# Patient Record
Sex: FEMALE | Race: WHITE | Employment: OTHER | URBAN - METROPOLITAN AREA
[De-identification: names, ages, dates, MRNs, and addresses within clinical notes are randomized per-mention and may not be internally consistent; named-entity substitution may affect disease eponyms.]

---

## 2017-01-03 ENCOUNTER — HOSPITAL ENCOUNTER (OUTPATIENT)
Age: 72
Discharge: HOME | End: 2017-01-03
Attending: FAMILY MEDICINE

## 2017-01-03 ENCOUNTER — APPOINTMENT (OUTPATIENT)
Dept: GENERAL RADIOLOGY | Age: 72
End: 2017-01-03
Attending: FAMILY MEDICINE

## 2017-01-03 VITALS
RESPIRATION RATE: 18 BRPM | TEMPERATURE: 98 F | BODY MASS INDEX: 27.97 KG/M2 | HEART RATE: 94 BPM | DIASTOLIC BLOOD PRESSURE: 90 MMHG | WEIGHT: 152 LBS | OXYGEN SATURATION: 95 % | HEIGHT: 62 IN | SYSTOLIC BLOOD PRESSURE: 155 MMHG

## 2017-01-03 DIAGNOSIS — J20.9 ACUTE BRONCHITIS, UNSPECIFIED ORGANISM: Primary | ICD-10-CM

## 2017-01-03 PROCEDURE — 71020 XR CHEST PA + LAT CHEST (CPT=71020): CPT

## 2017-01-03 PROCEDURE — 99204 OFFICE O/P NEW MOD 45 MIN: CPT

## 2017-01-03 PROCEDURE — 99213 OFFICE O/P EST LOW 20 MIN: CPT

## 2017-01-03 RX ORDER — AZITHROMYCIN 250 MG/1
TABLET, FILM COATED ORAL
Qty: 1 PACKAGE | Refills: 0 | Status: SHIPPED | OUTPATIENT
Start: 2017-01-03 | End: 2017-01-08

## 2017-01-03 NOTE — ED PROVIDER NOTES
Patient Seen in: [facility]    History   Patient presents with:  Cough/URI    Stated Complaint: cough for 9 days     HPI    Yari Necessary is a 73year old female presented with chief complaint of cough for 9 days.   Magdy routine general medical examination 6/29/2012   • Diverticulosis 6/1/2009     and hyperplastic polyp            Past Surgical History    COLONOSCOPY  2001, 2006, 6/2009    Comment diverticulosis    TUBAL LIGATION  6/1/1989    Comment Laparoscopy with B/L t above.    PSFH elements reviewed from today and agreed except as otherwise stated in HPI.     Physical Exam     ED Triage Vitals   BP 01/03/17 1037 155/90 mmHg   Pulse 01/03/17 1037 94   Resp 01/03/17 1037 18   Temp 01/03/17 1037 98.2 °F (36.8 °C)   Temp sr crepitations. Chest x-ray showed no acute consolidation or pneumonia. Will treat with Z-Master for now. Follow-up with PCP if not better.   If has any worsening symptoms go to the      Disposition and Plan     Clinical Impression:  Acute bronchitis, unspeci

## 2017-01-09 ENCOUNTER — OFFICE VISIT (OUTPATIENT)
Dept: INTERNAL MEDICINE CLINIC | Facility: CLINIC | Age: 72
End: 2017-01-09

## 2017-01-09 VITALS
BODY MASS INDEX: 28.35 KG/M2 | HEIGHT: 62.5 IN | WEIGHT: 158 LBS | HEART RATE: 106 BPM | SYSTOLIC BLOOD PRESSURE: 130 MMHG | TEMPERATURE: 98 F | RESPIRATION RATE: 12 BRPM | OXYGEN SATURATION: 96 % | DIASTOLIC BLOOD PRESSURE: 80 MMHG

## 2017-01-09 DIAGNOSIS — R05.9 COUGH: Primary | ICD-10-CM

## 2017-01-09 DIAGNOSIS — R09.89 ABNORMAL LUNG SOUNDS: ICD-10-CM

## 2017-01-09 DIAGNOSIS — R93.89 ABNORMAL CHEST X-RAY: ICD-10-CM

## 2017-01-09 PROCEDURE — 99214 OFFICE O/P EST MOD 30 MIN: CPT | Performed by: INTERNAL MEDICINE

## 2017-01-09 RX ORDER — FLUTICASONE PROPIONATE AND SALMETEROL 100; 50 UG/1; UG/1
1 POWDER RESPIRATORY (INHALATION) 2 TIMES DAILY
Qty: 60 EACH | Refills: 0 | COMMUNITY
Start: 2017-01-09 | End: 2017-01-16 | Stop reason: ALTCHOICE

## 2017-01-09 NOTE — PROGRESS NOTES
Ana Luisa Child is a 73year old female.   HPI:   CC: follow up from immediate care  Pt had cxr normal  Finished zpak this past Sat  Pt  Has nonproductive cough since Christmas Day  Coughing attacks improved with zpak  Still hears or feels a noise/rattle  N augmentation    • Osteoarthrosis, localized, primary, knee 6/14/2011   • Medial meniscus tear 10/10/2011   • Laboratory examination ordered as part of a routine general medical examination 6/29/2012   • Diverticulosis 6/1/2009     and hyperplastic polyp injected, TM intact no erythema dull bilateral , oropharynx clear, posterior pharynx without erythema, clear postnasal drip, nasal turbinates swollen left greater than right  NECK: supple,no masses,  LYMPH: no cervical adenopathy,   CARDIO: [id number] no S3S4

## 2017-01-16 ENCOUNTER — OFFICE VISIT (OUTPATIENT)
Dept: INTERNAL MEDICINE CLINIC | Facility: CLINIC | Age: 72
End: 2017-01-16

## 2017-01-16 VITALS
WEIGHT: 158 LBS | RESPIRATION RATE: 20 BRPM | SYSTOLIC BLOOD PRESSURE: 120 MMHG | TEMPERATURE: 98 F | BODY MASS INDEX: 28.35 KG/M2 | HEART RATE: 84 BPM | HEIGHT: 62.5 IN | OXYGEN SATURATION: 98 % | DIASTOLIC BLOOD PRESSURE: 80 MMHG

## 2017-01-16 DIAGNOSIS — J98.9 REACTIVE AIRWAY DISEASE THAT IS NOT ASTHMA: Primary | ICD-10-CM

## 2017-01-16 PROCEDURE — 99213 OFFICE O/P EST LOW 20 MIN: CPT | Performed by: INTERNAL MEDICINE

## 2017-01-16 NOTE — PROGRESS NOTES
Minnie Costa is a 73year old female. HPI:   CC:cough better   Finished advair already  Pt does not hear abnormal sounds anymore  Pt sleeping well        ALL:  Vicodin [Hydrocodon*        Comment:CONSTIPATION.     Current Outpatient Prescriptions:  Asco medical examination 6/29/2012   • Diverticulosis 6/1/2009     and hyperplastic polyp          Past Surgical History    COLONOSCOPY  2001, 2006, 6/2009    Comment diverticulosis    TUBAL LIGATION  6/1/1989    Comment Laparoscopy with B/L tubal fulguration a murmur,  LUNGS: clear to auscultation,       ASSESSMENT AND PLAN:   Reactive airway disease that is not asthma  (primary encounter diagnosis)- resolved        Meds & Refills for this Visit:  No prescriptions requested or ordered in this encounter    Renin

## 2017-01-27 ENCOUNTER — TELEPHONE (OUTPATIENT)
Dept: INTERNAL MEDICINE CLINIC | Facility: CLINIC | Age: 72
End: 2017-01-27

## 2017-01-27 DIAGNOSIS — L57.0 ACTINIC KERATOSIS: Primary | ICD-10-CM

## 2017-01-27 NOTE — TELEPHONE ENCOUNTER
Referral placed 8/18/16 to Dr. Falguni Hewitt was for 3 visits, but only 1 visit approved.   Ok to place new referral?

## 2017-01-27 NOTE — TELEPHONE ENCOUNTER
Incoming (mail or fax): Fax  Received from:  Fawn Finch- needs more information regarding referral  Documentation given to:  [address] fax bin.

## 2017-03-07 ENCOUNTER — TELEPHONE (OUTPATIENT)
Dept: INTERNAL MEDICINE CLINIC | Facility: CLINIC | Age: 72
End: 2017-03-07

## 2017-08-01 ENCOUNTER — OFFICE VISIT (OUTPATIENT)
Dept: INTERNAL MEDICINE CLINIC | Facility: CLINIC | Age: 72
End: 2017-08-01

## 2017-08-01 VITALS
SYSTOLIC BLOOD PRESSURE: 120 MMHG | OXYGEN SATURATION: 98 % | BODY MASS INDEX: 27.81 KG/M2 | HEIGHT: 62.5 IN | RESPIRATION RATE: 16 BRPM | DIASTOLIC BLOOD PRESSURE: 70 MMHG | HEART RATE: 90 BPM | WEIGHT: 155 LBS

## 2017-08-01 DIAGNOSIS — Z00.00 ENCOUNTER FOR ANNUAL HEALTH EXAMINATION: ICD-10-CM

## 2017-08-01 DIAGNOSIS — Z78.0 POSTMENOPAUSAL: ICD-10-CM

## 2017-08-01 DIAGNOSIS — Z86.010 HX OF COLONIC POLYPS: ICD-10-CM

## 2017-08-01 DIAGNOSIS — Z80.0 FAMILY HX OF COLON CANCER: ICD-10-CM

## 2017-08-01 DIAGNOSIS — Z12.31 VISIT FOR SCREENING MAMMOGRAM: ICD-10-CM

## 2017-08-01 DIAGNOSIS — E78.00 HIGH CHOLESTEROL: Primary | ICD-10-CM

## 2017-08-01 PROBLEM — Z96.651 STATUS POST TOTAL RIGHT KNEE REPLACEMENT: Status: ACTIVE | Noted: 2017-08-01

## 2017-08-01 PROBLEM — M47.814 THORACIC ARTHRITIS: Status: ACTIVE | Noted: 2017-08-01

## 2017-08-01 PROCEDURE — 96160 PT-FOCUSED HLTH RISK ASSMT: CPT | Performed by: INTERNAL MEDICINE

## 2017-08-01 NOTE — PROGRESS NOTES
HPI:   Jg Green is a 73year old female who presents for a MA (Medicare Advantage) [facility] (Once per calendar year).       Annual Physical due on 08/18/2017        Patient Care Team: Patient Care Team:  Hai Santos, DO as PCP - General  Hai Santos, flashes; Irregular periods (1995); Knee pain (2011); Laboratory examination ordered as part of a routine general medical examination (6/29/2012); Laceration of finger, right (3/24/2001); Measles; Medial meniscus tear (10/10/2011); Mole (skin) (1999);  Mumps lb   SpO2 98%   BMI 27.90 kg/m²  Estimated body mass index is 27.9 kg/m² as calculated from the following:    Height as of this encounter: 62.5\". Weight as of this encounter: 155 lb.            GENERAL: well developed, well nourished, in no apparent dis 10/28/2015   • Pneumovax 23 11/30/2016   • TD 01/01/2005   • TDAP 06/08/2011   • Zoster (Shingles) 08/17/2014       ASSESSMENT AND OTHER RELEVANT CHRONIC CONDITIONS:   Stan Catherine is a 73year old female who presents for a Medicare Assessment.    Kathleen Moralez been poor?: No    How does the patient maintain a good energy level?: Appropriate Exercise    How would you describe your daily physical activity?: Moderate    How would you describe your current health state?: Good    How do you maintain positive mental w Yes    Do you have a living will?: Yes       Cognitive Assessment     What day of the week is this?: Correct    What month is it?: Correct    What year is it?: Correct    Recall \"Ball\": Correct    Recall \"Flag\": Correct    Recall \"Tree\": Correct 11/22/2017 Update Health Maintenance if applicable     Immunizations (Update Immunization Activity if applicable)     Influenza  Covered Annually 9/28/2016 Please get every year    Pneumococcal 13 (Prevnar)  Covered Once after 65 10/28/2015 Please get once (H)    No flowsheet data found. Dilated Eye exam  Annually No flowsheet data found. No flowsheet data found. COPD      Spirometry Testing Annually Spirometry date:  No flowsheet data found.          Template: JOHN SALONI MEDICARE ANNUAL ASSESSMENT FEMAL

## 2017-08-01 NOTE — PATIENT INSTRUCTIONS
Kaylee Byrd's SCREENING SCHEDULE   Tests on this list are recommended by your physician but may not be covered, or covered at this frequency, by your insurer. Please check with your insurance carrier before scheduling to verify coverage.    PREVENTATI Covered up to Age 76     Colonoscopy Screen   Covered every 10 years- more often if abnormal Colonoscopy,3 Years due on 04/24/2015 Update Health Maintenance if applicable    Flex Sigmoidoscopy Screen  Covered every 5 years No results found for this or any 10/28/15  -PNEUMOCOCCAL VACC, 13 CAITLYN IM    Please get once after your 65th birthday    Pneumococcal 23 (Pneumovax)  Covered Once after 65 No orders found for this or any previous visit.  Please get once after your 65th birthday    Hepatitis B for Moderate/H

## 2017-08-15 ENCOUNTER — LAB ENCOUNTER (OUTPATIENT)
Dept: LAB | Age: 72
End: 2017-08-15
Attending: INTERNAL MEDICINE
Payer: MEDICARE

## 2017-08-15 DIAGNOSIS — E78.00 HIGH CHOLESTEROL: ICD-10-CM

## 2017-08-15 DIAGNOSIS — Z00.00 ENCOUNTER FOR ANNUAL HEALTH EXAMINATION: ICD-10-CM

## 2017-08-15 LAB
ALBUMIN SERPL-MCNC: 4 G/DL (ref 3.5–4.8)
ALP LIVER SERPL-CCNC: 76 U/L (ref 55–142)
ALT SERPL-CCNC: 31 U/L (ref 14–54)
AST SERPL-CCNC: 27 U/L (ref 15–41)
BILIRUB SERPL-MCNC: 0.4 MG/DL (ref 0.1–2)
BUN BLD-MCNC: 14 MG/DL (ref 8–20)
CALCIUM BLD-MCNC: 9.5 MG/DL (ref 8.3–10.3)
CHLORIDE: 107 MMOL/L (ref 101–111)
CHOLEST SMN-MCNC: 225 MG/DL (ref ?–200)
CO2: 26 MMOL/L (ref 22–32)
CREAT BLD-MCNC: 0.74 MG/DL (ref 0.55–1.02)
GLUCOSE BLD-MCNC: 81 MG/DL (ref 70–99)
HDLC SERPL-MCNC: 81 MG/DL (ref 45–?)
HDLC SERPL: 2.78 {RATIO} (ref ?–4.44)
HEPATITIS C VIRUS AB INTERPRETATION: NONREACTIVE
LDLC SERPL CALC-MCNC: 119 MG/DL (ref ?–130)
LDLC SERPL-MCNC: 25 MG/DL (ref 5–40)
M PROTEIN MFR SERPL ELPH: 7.1 G/DL (ref 6.1–8.3)
NONHDLC SERPL-MCNC: 144 MG/DL (ref ?–130)
POTASSIUM SERPL-SCNC: 4.1 MMOL/L (ref 3.6–5.1)
SODIUM SERPL-SCNC: 141 MMOL/L (ref 136–144)
TRIGLYCERIDES: 127 MG/DL (ref ?–150)

## 2017-08-15 PROCEDURE — 86803 HEPATITIS C AB TEST: CPT

## 2017-08-15 PROCEDURE — 80061 LIPID PANEL: CPT

## 2017-08-15 PROCEDURE — 80053 COMPREHEN METABOLIC PANEL: CPT

## 2017-08-15 PROCEDURE — 36415 COLL VENOUS BLD VENIPUNCTURE: CPT

## 2017-12-06 ENCOUNTER — TELEPHONE (OUTPATIENT)
Dept: INTERNAL MEDICINE CLINIC | Facility: CLINIC | Age: 72
End: 2017-12-06

## 2017-12-06 DIAGNOSIS — L57.0 ACTINIC KERATOSIS: Primary | ICD-10-CM

## 2017-12-06 NOTE — TELEPHONE ENCOUNTER
Functional Status Done?:  yes                               Functional Status Needs Review?:  no    Specialty Provider's Name?:  Dr Gina Padgett?:  derm  Reason for Visit?: check up  Diagnosis?:  Skin check  Number of Visits Requested?:  3

## 2017-12-07 NOTE — TELEPHONE ENCOUNTER
Noted below. Order for referral placed. Spoke with pt. Advised that referral placed. Pt voiced understanding.

## 2018-01-26 ENCOUNTER — HOSPITAL ENCOUNTER (OUTPATIENT)
Dept: MAMMOGRAPHY | Age: 73
Discharge: HOME | End: 2018-01-26
Attending: INTERNAL MEDICINE
Payer: MEDICARE

## 2018-01-26 ENCOUNTER — HOSPITAL ENCOUNTER (OUTPATIENT)
Dept: BONE DENSITY | Age: 73
Discharge: HOME | End: 2018-01-26
Attending: INTERNAL MEDICINE
Payer: MEDICARE

## 2018-01-26 DIAGNOSIS — Z12.31 VISIT FOR SCREENING MAMMOGRAM: ICD-10-CM

## 2018-01-26 DIAGNOSIS — Z78.0 POSTMENOPAUSAL: ICD-10-CM

## 2018-01-26 PROCEDURE — 77067 SCR MAMMO BI INCL CAD: CPT | Performed by: INTERNAL MEDICINE

## 2018-01-26 PROCEDURE — 77080 DXA BONE DENSITY AXIAL: CPT | Performed by: INTERNAL MEDICINE

## 2018-01-29 ENCOUNTER — TELEPHONE (OUTPATIENT)
Dept: INTERNAL MEDICINE CLINIC | Facility: CLINIC | Age: 73
End: 2018-01-29

## 2018-05-29 PROCEDURE — 88305 TISSUE EXAM BY PATHOLOGIST: CPT | Performed by: INTERNAL MEDICINE

## 2018-06-15 ENCOUNTER — TELEPHONE (OUTPATIENT)
Dept: INTERNAL MEDICINE CLINIC | Facility: CLINIC | Age: 73
End: 2018-06-15

## 2018-06-15 DIAGNOSIS — L57.0 ACTINIC KERATOSES: Primary | ICD-10-CM

## 2018-06-18 NOTE — TELEPHONE ENCOUNTER
LM for pt to call back, could not leave [facility]. Referral placed for Dr Mar Orozco.

## 2018-06-26 ENCOUNTER — TELEPHONE (OUTPATIENT)
Dept: INTERNAL MEDICINE CLINIC | Facility: CLINIC | Age: 73
End: 2018-06-26

## 2018-06-26 NOTE — TELEPHONE ENCOUNTER
LMOM for patient to change PCP to new doctor. PSR going over supervisit list, noticed patient scheduled a supervisit with new PCP, lmom for patient to call P to get name changed. PSR told patient to call back with any questions.

## 2018-07-10 ENCOUNTER — OFFICE VISIT (OUTPATIENT)
Dept: INTERNAL MEDICINE CLINIC | Facility: CLINIC | Age: 73
End: 2018-07-10

## 2018-07-10 VITALS
HEIGHT: 62 IN | SYSTOLIC BLOOD PRESSURE: 100 MMHG | WEIGHT: 147 LBS | TEMPERATURE: 99 F | RESPIRATION RATE: 16 BRPM | BODY MASS INDEX: 27.05 KG/M2 | DIASTOLIC BLOOD PRESSURE: 60 MMHG | HEART RATE: 72 BPM

## 2018-07-10 DIAGNOSIS — Z00.00 ROUTINE GENERAL MEDICAL EXAMINATION AT A HEALTH CARE FACILITY: Primary | ICD-10-CM

## 2018-07-10 DIAGNOSIS — K63.5 SESSILE COLONIC POLYP: ICD-10-CM

## 2018-07-10 DIAGNOSIS — Z12.83 SCREENING FOR SKIN CANCER: ICD-10-CM

## 2018-07-10 DIAGNOSIS — M81.0 AGE-RELATED OSTEOPOROSIS WITHOUT CURRENT PATHOLOGICAL FRACTURE: ICD-10-CM

## 2018-07-10 DIAGNOSIS — E78.2 MIXED HYPERLIPIDEMIA: ICD-10-CM

## 2018-07-10 DIAGNOSIS — Z12.39 SCREENING BREAST EXAMINATION: ICD-10-CM

## 2018-07-10 PROBLEM — M47.814 THORACIC ARTHRITIS: Status: RESOLVED | Noted: 2017-08-01 | Resolved: 2018-07-10

## 2018-07-10 PROCEDURE — G0438 PPPS, INITIAL VISIT: HCPCS | Performed by: INTERNAL MEDICINE

## 2018-07-10 PROCEDURE — 96160 PT-FOCUSED HLTH RISK ASSMT: CPT | Performed by: INTERNAL MEDICINE

## 2018-07-10 NOTE — PATIENT INSTRUCTIONS
You need 3 eight ounce servings of calcium/vitamin D daily. This includes spinach, kale, broccoli, almonds, milk, yogurt, or cottage cheese. Please have fasting labs completed as soon as possible.  I will order the alendronate for your bones once your v

## 2018-07-10 NOTE — PROGRESS NOTES
Ana Luisa Child is a 81year old female. HPI:   Patient presents for medicare supervisit and additional issues noted below. 1. Strong family history of colon cancer: she just had colonoscopy completed. She has no concerns.    2. Overweight: she has king density (simple cyst)   • Cervical lesion 1995   • Chest tightness    • Chest wall pain    • Chickenpox    • Cough    • Diverticulitis 2002, 5/09 4/02-sigmoid diverticulitis   • Diverticulosis 6/2009    and hyperplastic polyp   • Diverticulosis 6/1/2009 Laparoscopy with B/L tubal fulguration and                cutting   Social History:    Smoking status: Never Smoker                                                              Smokeless tobacco: Never Used                      Alcohol use:  Yes mammogram  Bone Health/Fall Prevention (Hernan Chi): see above  Vaccines: up to date with prevnar 13, pneumovac 23. TDAP 2011. Shingles 2014. Cont yearly flu shot. Advised on shingrix vaccine. Hep C screening (born [birth year]):  Hep C Ab negative in 2017  a

## 2018-07-11 ENCOUNTER — APPOINTMENT (OUTPATIENT)
Dept: LAB | Age: 73
End: 2018-07-11
Attending: INTERNAL MEDICINE
Payer: MEDICARE

## 2018-07-11 DIAGNOSIS — Z12.83 SCREENING FOR SKIN CANCER: ICD-10-CM

## 2018-07-11 DIAGNOSIS — M81.0 AGE-RELATED OSTEOPOROSIS WITHOUT CURRENT PATHOLOGICAL FRACTURE: ICD-10-CM

## 2018-07-11 DIAGNOSIS — Z12.39 SCREENING BREAST EXAMINATION: ICD-10-CM

## 2018-07-11 DIAGNOSIS — E78.2 MIXED HYPERLIPIDEMIA: ICD-10-CM

## 2018-07-11 DIAGNOSIS — K63.5 SESSILE COLONIC POLYP: ICD-10-CM

## 2018-07-11 DIAGNOSIS — Z00.00 ROUTINE GENERAL MEDICAL EXAMINATION AT A HEALTH CARE FACILITY: ICD-10-CM

## 2018-07-11 LAB
25-HYDROXYVITAMIN D (TOTAL): 28.5 NG/ML (ref 30–100)
BUN BLD-MCNC: 15 MG/DL (ref 8–20)
CALCIUM BLD-MCNC: 9.4 MG/DL (ref 8.3–10.3)
CHLORIDE: 108 MMOL/L (ref 101–111)
CHOLEST SMN-MCNC: 199 MG/DL (ref ?–200)
CO2: 27 MMOL/L (ref 22–32)
CREAT BLD-MCNC: 0.81 MG/DL (ref 0.55–1.02)
GLUCOSE BLD-MCNC: 81 MG/DL (ref 70–99)
HDLC SERPL-MCNC: 71 MG/DL (ref 45–?)
HDLC SERPL: 2.8 {RATIO} (ref ?–4.44)
LDLC SERPL CALC-MCNC: 113 MG/DL (ref ?–130)
NONHDLC SERPL-MCNC: 128 MG/DL (ref ?–130)
POTASSIUM SERPL-SCNC: 4.3 MMOL/L (ref 3.6–5.1)
SODIUM SERPL-SCNC: 141 MMOL/L (ref 136–144)
TRIGL SERPL-MCNC: 73 MG/DL (ref ?–150)
VLDLC SERPL CALC-MCNC: 15 MG/DL (ref 5–40)

## 2018-07-11 PROCEDURE — 36415 COLL VENOUS BLD VENIPUNCTURE: CPT

## 2018-07-11 PROCEDURE — 80048 BASIC METABOLIC PNL TOTAL CA: CPT

## 2018-07-11 PROCEDURE — 80061 LIPID PANEL: CPT

## 2018-07-11 PROCEDURE — 82306 VITAMIN D 25 HYDROXY: CPT

## 2018-08-29 ENCOUNTER — TELEPHONE (OUTPATIENT)
Dept: INTERNAL MEDICINE CLINIC | Facility: CLINIC | Age: 73
End: 2018-08-29

## 2019-02-19 ENCOUNTER — TELEPHONE (OUTPATIENT)
Dept: INTERNAL MEDICINE CLINIC | Facility: CLINIC | Age: 74
End: 2019-02-19

## 2019-02-19 NOTE — TELEPHONE ENCOUNTER
Pt called to request a referral for a mammogram.  Please notify pt via [email or web address][employer]t when referral has been approved.

## 2019-02-22 ENCOUNTER — HOSPITAL ENCOUNTER (OUTPATIENT)
Dept: MAMMOGRAPHY | Age: 74
Discharge: HOME | End: 2019-02-22
Attending: INTERNAL MEDICINE
Payer: MEDICARE

## 2019-02-22 DIAGNOSIS — Z12.39 SCREENING BREAST EXAMINATION: ICD-10-CM

## 2019-02-22 DIAGNOSIS — Z12.83 SCREENING FOR SKIN CANCER: ICD-10-CM

## 2019-02-22 DIAGNOSIS — M81.0 AGE-RELATED OSTEOPOROSIS WITHOUT CURRENT PATHOLOGICAL FRACTURE: ICD-10-CM

## 2019-02-22 DIAGNOSIS — K63.5 SESSILE COLONIC POLYP: ICD-10-CM

## 2019-02-22 DIAGNOSIS — Z00.00 ROUTINE GENERAL MEDICAL EXAMINATION AT A HEALTH CARE FACILITY: ICD-10-CM

## 2019-02-22 DIAGNOSIS — E78.2 MIXED HYPERLIPIDEMIA: ICD-10-CM

## 2019-02-22 PROCEDURE — 77067 SCR MAMMO BI INCL CAD: CPT | Performed by: INTERNAL MEDICINE

## 2019-02-22 PROCEDURE — 77063 BREAST TOMOSYNTHESIS BI: CPT | Performed by: INTERNAL MEDICINE

## 2019-07-10 ENCOUNTER — OFFICE VISIT (OUTPATIENT)
Dept: INTERNAL MEDICINE CLINIC | Facility: CLINIC | Age: 74
End: 2019-07-10
Payer: MEDICARE

## 2019-07-10 VITALS
RESPIRATION RATE: 12 BRPM | HEART RATE: 75 BPM | DIASTOLIC BLOOD PRESSURE: 64 MMHG | BODY MASS INDEX: 25.34 KG/M2 | HEIGHT: 63 IN | SYSTOLIC BLOOD PRESSURE: 122 MMHG | WEIGHT: 143 LBS | OXYGEN SATURATION: 98 % | TEMPERATURE: 98 F

## 2019-07-10 DIAGNOSIS — Z12.83 SCREENING FOR SKIN CANCER: ICD-10-CM

## 2019-07-10 DIAGNOSIS — E78.2 MIXED HYPERLIPIDEMIA: ICD-10-CM

## 2019-07-10 DIAGNOSIS — Z00.00 ROUTINE GENERAL MEDICAL EXAMINATION AT A HEALTH CARE FACILITY: Primary | ICD-10-CM

## 2019-07-10 DIAGNOSIS — M46.84 OTHER SPECIFIED INFLAMMATORY SPONDYLOPATHIES, THORACIC REGION (HCC): ICD-10-CM

## 2019-07-10 DIAGNOSIS — M81.0 AGE-RELATED OSTEOPOROSIS WITHOUT CURRENT PATHOLOGICAL FRACTURE: ICD-10-CM

## 2019-07-10 DIAGNOSIS — Z12.31 VISIT FOR SCREENING MAMMOGRAM: ICD-10-CM

## 2019-07-10 DIAGNOSIS — K63.5 SESSILE COLONIC POLYP: ICD-10-CM

## 2019-07-10 PROCEDURE — G0439 PPPS, SUBSEQ VISIT: HCPCS | Performed by: INTERNAL MEDICINE

## 2019-07-10 PROCEDURE — 96160 PT-FOCUSED HLTH RISK ASSMT: CPT | Performed by: INTERNAL MEDICINE

## 2019-07-10 PROCEDURE — 99397 PER PM REEVAL EST PAT 65+ YR: CPT | Performed by: INTERNAL MEDICINE

## 2019-07-10 NOTE — PATIENT INSTRUCTIONS
I also advise you get the shingrix vaccine once in a lifetime. This is NEW and considered better than the previous shingles vaccine named, zostavax. It can cost up to $200. I advise you get the annual flu shot every September, October.     Kurt M

## 2019-07-10 NOTE — PROGRESS NOTES
Kiesha Bains is a 72year old female. HPI:   Patient presents for medicare supervisit and additional issues noted below. 1. HLP: 10 year ASCVD score is 9% based on lipids from last year.   2. 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    Karrie Manzano MD

## 2019-07-11 ENCOUNTER — APPOINTMENT (OUTPATIENT)
Dept: LAB | Age: 74
End: 2019-07-11
Attending: INTERNAL MEDICINE
Payer: MEDICARE

## 2019-07-11 DIAGNOSIS — M81.0 AGE-RELATED OSTEOPOROSIS WITHOUT CURRENT PATHOLOGICAL FRACTURE: ICD-10-CM

## 2019-07-11 DIAGNOSIS — Z12.83 SCREENING FOR SKIN CANCER: ICD-10-CM

## 2019-07-11 DIAGNOSIS — M46.84 OTHER SPECIFIED INFLAMMATORY SPONDYLOPATHIES, THORACIC REGION (HCC): ICD-10-CM

## 2019-07-11 DIAGNOSIS — Z00.00 ROUTINE GENERAL MEDICAL EXAMINATION AT A HEALTH CARE FACILITY: ICD-10-CM

## 2019-07-11 LAB
ALT SERPL-CCNC: 26 U/L (ref 13–56)
ANION GAP SERPL CALC-SCNC: 5 MMOL/L (ref 0–18)
AST SERPL-CCNC: 24 U/L (ref 15–37)
BUN BLD-MCNC: 13 MG/DL (ref 7–18)
BUN/CREAT SERPL: 16.7 (ref 10–20)
CALCIUM BLD-MCNC: 9.5 MG/DL (ref 8.5–10.1)
CHLORIDE SERPL-SCNC: 107 MMOL/L (ref 98–112)
CHOLEST SMN-MCNC: 214 MG/DL (ref ?–200)
CO2 SERPL-SCNC: 28 MMOL/L (ref 21–32)
CREAT BLD-MCNC: 0.78 MG/DL (ref 0.55–1.02)
GLUCOSE BLD-MCNC: 85 MG/DL (ref 70–99)
HDLC SERPL-MCNC: 101 MG/DL (ref 40–59)
LDLC SERPL CALC-MCNC: 101 MG/DL (ref ?–100)
NONHDLC SERPL-MCNC: 113 MG/DL (ref ?–130)
OSMOLALITY SERPL CALC.SUM OF ELEC: 289 MOSM/KG (ref 275–295)
POTASSIUM SERPL-SCNC: 4.2 MMOL/L (ref 3.5–5.1)
SODIUM SERPL-SCNC: 140 MMOL/L (ref 136–145)
TRIGL SERPL-MCNC: 58 MG/DL (ref 30–149)
VIT D+METAB SERPL-MCNC: 40.9 NG/ML (ref 30–100)
VLDLC SERPL CALC-MCNC: 12 MG/DL (ref 0–30)

## 2019-07-11 PROCEDURE — 82306 VITAMIN D 25 HYDROXY: CPT

## 2019-07-11 PROCEDURE — 80048 BASIC METABOLIC PNL TOTAL CA: CPT

## 2019-07-11 PROCEDURE — 84460 ALANINE AMINO (ALT) (SGPT): CPT

## 2019-07-11 PROCEDURE — 36415 COLL VENOUS BLD VENIPUNCTURE: CPT

## 2019-07-11 PROCEDURE — 80061 LIPID PANEL: CPT

## 2019-07-11 PROCEDURE — 84450 TRANSFERASE (AST) (SGOT): CPT

## 2019-09-23 ENCOUNTER — TELEPHONE (OUTPATIENT)
Dept: INTERNAL MEDICINE CLINIC | Facility: CLINIC | Age: 74
End: 2019-09-23

## 2019-09-23 DIAGNOSIS — R19.7 DIARRHEA, UNSPECIFIED TYPE: ICD-10-CM

## 2019-09-23 DIAGNOSIS — K59.00 CONSTIPATION, UNSPECIFIED CONSTIPATION TYPE: Primary | ICD-10-CM

## 2019-09-23 NOTE — TELEPHONE ENCOUNTER
Referral pending approval if appropriate, but d/t reason for referral would you like to see pt?     .Reason for the order/referral constipation / diarrhea / bloody stool   PCP: Dr. Maru Goncalves   Refer to Provider : Dr. Erin Gambino   Specialty: Ria Sifuentes   Patient In

## 2019-10-18 ENCOUNTER — HOSPITAL ENCOUNTER (OUTPATIENT)
Dept: CT IMAGING | Age: 74
Discharge: HOME | End: 2019-10-18
Attending: INTERNAL MEDICINE

## 2019-10-18 DIAGNOSIS — Z13.6 SCREENING FOR HEART DISEASE: ICD-10-CM

## 2019-10-18 NOTE — PROGRESS NOTES
[facility] [address] SCORE=0  FY=036/84    Cholestec labs as follows:labs done 7/11/19  IM=899  YWI=944  MQG=163  TG=58  GLUCOSE=85 fasting  All results and risk factors discussed with patient; all questions and concerns Estelle Can

## 2020-08-14 ENCOUNTER — TELEPHONE (OUTPATIENT)
Dept: INTERNAL MEDICINE CLINIC | Facility: CLINIC | Age: 75
End: 2020-08-14

## 2020-08-14 DIAGNOSIS — Z12.83 SCREENING EXAM FOR SKIN CANCER: Primary | ICD-10-CM

## 2020-08-14 NOTE — TELEPHONE ENCOUNTER
Referral pending approval if appropriate. Thank you!     .Reason for the order/referral:DERMATOLOGY/CHECK UP   PCP: Jeff Meyer   Refer to Provider (Cassi Rubio   Patient Insurance: Payor: Natalie Valerio / Plan: Charisma Lopes MA O / Alana Breen

## 2020-08-14 NOTE — TELEPHONE ENCOUNTER
Patient calling back nurse; advised referral has been entered for her Dr Felton Khalil per her request but she is due for [facility] 2020; patient will callback to schedule

## 2020-08-31 ENCOUNTER — TELEPHONE (OUTPATIENT)
Dept: CASE MANAGEMENT | Age: 75
End: 2020-08-31

## 2020-09-29 ENCOUNTER — OFFICE VISIT (OUTPATIENT)
Dept: INTERNAL MEDICINE CLINIC | Facility: CLINIC | Age: 75
End: 2020-09-29
Payer: MEDICARE

## 2020-09-29 VITALS
HEART RATE: 88 BPM | RESPIRATION RATE: 16 BRPM | HEIGHT: 63 IN | WEIGHT: 144 LBS | OXYGEN SATURATION: 98 % | TEMPERATURE: 98 F | SYSTOLIC BLOOD PRESSURE: 124 MMHG | BODY MASS INDEX: 25.52 KG/M2 | DIASTOLIC BLOOD PRESSURE: 62 MMHG

## 2020-09-29 DIAGNOSIS — L57.0 ACTINIC KERATOSIS: ICD-10-CM

## 2020-09-29 DIAGNOSIS — Z12.31 VISIT FOR SCREENING MAMMOGRAM: ICD-10-CM

## 2020-09-29 DIAGNOSIS — M81.0 AGE-RELATED OSTEOPOROSIS WITHOUT CURRENT PATHOLOGICAL FRACTURE: ICD-10-CM

## 2020-09-29 DIAGNOSIS — Z00.00 ROUTINE GENERAL MEDICAL EXAMINATION AT A HEALTH CARE FACILITY: Primary | ICD-10-CM

## 2020-09-29 DIAGNOSIS — K63.5 SESSILE COLONIC POLYP: ICD-10-CM

## 2020-09-29 PROCEDURE — 3074F SYST BP LT 130 MM HG: CPT | Performed by: INTERNAL MEDICINE

## 2020-09-29 PROCEDURE — 99397 PER PM REEVAL EST PAT 65+ YR: CPT | Performed by: INTERNAL MEDICINE

## 2020-09-29 PROCEDURE — G0439 PPPS, SUBSEQ VISIT: HCPCS | Performed by: INTERNAL MEDICINE

## 2020-09-29 PROCEDURE — 96160 PT-FOCUSED HLTH RISK ASSMT: CPT | Performed by: INTERNAL MEDICINE

## 2020-09-29 PROCEDURE — 3078F DIAST BP <80 MM HG: CPT | Performed by: INTERNAL MEDICINE

## 2020-09-29 PROCEDURE — 3008F BODY MASS INDEX DOCD: CPT | Performed by: INTERNAL MEDICINE

## 2020-09-29 RX ORDER — A/SINGAPORE/GP1908/2015 IVR-180 (AN A/MICHIGAN/45/2015 (H1N1)PDM09-LIKE VIRUS, A/HONG KONG/4801/2014, NYMC X-263B (H3N2) (AN A/HONG KONG/4801/2014-LIKE VIRUS), AND B/BRISBANE/60/2008, WILD TYPE (A B/BRISBANE/60/2008-LIKE VIRUS) 15; 15; 15 UG/.5ML; UG/.5ML; UG/.5ML
0.5 INJECTION, SUSPENSION INTRAMUSCULAR SEE ADMIN INSTRUCTIONS
COMMUNITY
Start: 2020-09-23 | End: 2020-11-10

## 2020-09-29 NOTE — PROGRESS NOTES
Zack Cruz is a 69year old female. HPI:   Patient presents for medicare supervisit and additional issues noted below. She denies any concerns or questions. 1. Lifestyle: has been eating more carbs and sweets b/c locked up in house.  She is workin polyp   • Diverticulosis 6/1/2009    and hyperplastic polyp    • DUB (dysfunctional uterine bleeding) 2001    DUB/anovulatory bleeding   • H/O breast augmentation    • Heart palpitations    • Hematuria 1998, 1999   • High cholesterol     hx high cholestero (Oral)   Resp 16   Ht 63\"   Wt 144 lb (65.3 kg)   SpO2 98%   BMI 25.51 kg/m²   Medicare Hearing Evaluation: bilateral rub test is equal  GENERAL: A&O well developed, well nourished,in no apparent distress  SKIN: no rashes,no suspicious lesions  HEENT: atr issues and agrees to the plan. The patient is asked to return in 1 year for MAS.    Charlotte Edwards MD

## 2020-09-29 NOTE — PATIENT INSTRUCTIONS
Please schedule your test by utilizing one of the following options:  • Log into your Clicker account to schedule an appointment  • Schedule online by accessing [employer]  • Call Central Scheduling to schedule an appointment

## 2020-10-15 ENCOUNTER — HOSPITAL ENCOUNTER (OUTPATIENT)
Dept: MAMMOGRAPHY | Age: 75
Discharge: HOME | End: 2020-10-15
Attending: INTERNAL MEDICINE
Payer: MEDICARE

## 2020-10-15 ENCOUNTER — HOSPITAL ENCOUNTER (OUTPATIENT)
Dept: BONE DENSITY | Age: 75
Discharge: HOME | End: 2020-10-15
Attending: INTERNAL MEDICINE
Payer: MEDICARE

## 2020-10-15 DIAGNOSIS — M81.0 AGE-RELATED OSTEOPOROSIS WITHOUT CURRENT PATHOLOGICAL FRACTURE: ICD-10-CM

## 2020-10-15 DIAGNOSIS — Z12.31 VISIT FOR SCREENING MAMMOGRAM: ICD-10-CM

## 2020-10-15 PROCEDURE — 77080 DXA BONE DENSITY AXIAL: CPT | Performed by: INTERNAL MEDICINE

## 2020-10-15 PROCEDURE — 77067 SCR MAMMO BI INCL CAD: CPT | Performed by: INTERNAL MEDICINE

## 2020-10-15 PROCEDURE — 77063 BREAST TOMOSYNTHESIS BI: CPT | Performed by: INTERNAL MEDICINE

## 2020-10-16 ENCOUNTER — TELEPHONE (OUTPATIENT)
Dept: INTERNAL MEDICINE CLINIC | Facility: CLINIC | Age: 75
End: 2020-10-16

## 2020-10-16 NOTE — TELEPHONE ENCOUNTER
----- Message from Sandra Madison MD sent at 10/16/2020  9:34 AM CDT -----  Please arrange OV for patient to discuss her worsening osteoporosis.

## 2020-10-20 NOTE — TELEPHONE ENCOUNTER
Appointment scheduled    Future Appointments   Date Time Provider Alicia Fox   10/22/2020 10:30 AM MOHINDER Josue [facility] [place]

## 2020-10-22 ENCOUNTER — OFFICE VISIT (OUTPATIENT)
Dept: INTERNAL MEDICINE CLINIC | Facility: CLINIC | Age: 75
End: 2020-10-22
Payer: MEDICARE

## 2020-10-22 VITALS
HEIGHT: 63 IN | OXYGEN SATURATION: 98 % | SYSTOLIC BLOOD PRESSURE: 119 MMHG | RESPIRATION RATE: 16 BRPM | TEMPERATURE: 99 F | DIASTOLIC BLOOD PRESSURE: 80 MMHG | HEART RATE: 98 BPM | BODY MASS INDEX: 27.57 KG/M2 | WEIGHT: 155.63 LBS

## 2020-10-22 DIAGNOSIS — M85.89 OSTEOPENIA OF MULTIPLE SITES: Primary | ICD-10-CM

## 2020-10-22 PROCEDURE — 3008F BODY MASS INDEX DOCD: CPT | Performed by: PHYSICIAN ASSISTANT

## 2020-10-22 PROCEDURE — 3074F SYST BP LT 130 MM HG: CPT | Performed by: PHYSICIAN ASSISTANT

## 2020-10-22 PROCEDURE — 3079F DIAST BP 80-89 MM HG: CPT | Performed by: PHYSICIAN ASSISTANT

## 2020-10-22 PROCEDURE — 99213 OFFICE O/P EST LOW 20 MIN: CPT | Performed by: PHYSICIAN ASSISTANT

## 2020-10-22 RX ORDER — ALENDRONATE SODIUM 70 MG/1
70 TABLET ORAL WEEKLY
Qty: 12 TABLET | Refills: 3 | Status: SHIPPED | OUTPATIENT
Start: 2020-10-22 | End: 2021-09-09

## 2020-10-22 NOTE — PATIENT INSTRUCTIONS
Osteoporosis Medicines: Bisphosphonates  - please do your blood work ASAP (prior to starting medication)  - if labs are normal, please start Fosamax (alendronate) 70 mg - take 1 tablet once a week (see instructions below)  - continue your calcium and vit

## 2020-10-22 NOTE — PROGRESS NOTES
Demetris Gao is a 69year old female. HPI:   Patient presents for f/u of recent DEXA scan. This showed worsening BMD.  FRAX scores of 4.2% / 19%. Pt has not been on any rx therapy for osteoporosis in the past.  Does take an OTC ca & vit D supp.   D diverticulosis   • COLONOSCOPY, POSSIBLE BIOPSY, POSSIBLE POLYPECTOMY 71789 N/A 4/24/2012    Performed by Kolby Huang MD at [facility]   • COLONOSCOPY, POSSIBLE BIOPSY, POSSIBLE POLYPECTOMY 99677 N/A 6/12/2009    Performed by Jennifer Seals Ad years.    The patient indicates understanding of these issues and agrees to the plan. The patient is asked to return here in 9/2021 for wellness visit with Dr Demetris Stuart.

## 2020-10-23 ENCOUNTER — LAB ENCOUNTER (OUTPATIENT)
Dept: LAB | Age: 75
End: 2020-10-23
Attending: INTERNAL MEDICINE
Payer: MEDICARE

## 2020-10-23 DIAGNOSIS — Z00.00 ROUTINE GENERAL MEDICAL EXAMINATION AT A HEALTH CARE FACILITY: ICD-10-CM

## 2020-10-23 PROCEDURE — 80061 LIPID PANEL: CPT

## 2020-10-23 PROCEDURE — 80048 BASIC METABOLIC PNL TOTAL CA: CPT

## 2020-10-23 PROCEDURE — 82306 VITAMIN D 25 HYDROXY: CPT

## 2020-10-23 PROCEDURE — 36415 COLL VENOUS BLD VENIPUNCTURE: CPT

## 2020-11-10 ENCOUNTER — OFFICE VISIT (OUTPATIENT)
Dept: INTERNAL MEDICINE CLINIC | Facility: CLINIC | Age: 75
End: 2020-11-10
Payer: MEDICARE

## 2020-11-10 VITALS
DIASTOLIC BLOOD PRESSURE: 78 MMHG | BODY MASS INDEX: 27.46 KG/M2 | TEMPERATURE: 98 F | HEIGHT: 63 IN | SYSTOLIC BLOOD PRESSURE: 124 MMHG | OXYGEN SATURATION: 98 % | HEART RATE: 80 BPM | WEIGHT: 155 LBS | RESPIRATION RATE: 16 BRPM

## 2020-11-10 DIAGNOSIS — M25.552 LEFT HIP PAIN: Primary | ICD-10-CM

## 2020-11-10 DIAGNOSIS — M81.0 AGE-RELATED OSTEOPOROSIS WITHOUT CURRENT PATHOLOGICAL FRACTURE: ICD-10-CM

## 2020-11-10 PROCEDURE — 99214 OFFICE O/P EST MOD 30 MIN: CPT | Performed by: INTERNAL MEDICINE

## 2020-11-10 PROCEDURE — 3078F DIAST BP <80 MM HG: CPT | Performed by: INTERNAL MEDICINE

## 2020-11-10 PROCEDURE — 3008F BODY MASS INDEX DOCD: CPT | Performed by: INTERNAL MEDICINE

## 2020-11-10 PROCEDURE — 3074F SYST BP LT 130 MM HG: CPT | Performed by: INTERNAL MEDICINE

## 2020-11-10 NOTE — PATIENT INSTRUCTIONS
Please do not exceed 3000 mg (3 grams) of tylenol in 24 hours. It is very important you look at the amount of tylenol (acetaminophen) in any of your over the counter medications to ensure you do not exceed a safe amount of tylenol per day.     You can use

## 2020-11-10 NOTE — PROGRESS NOTES
Jodie Wallace is a 70year old female. HPI:   Patient presents for the following issues. 1. Osteoporosis: doing well on fosamax. Started it 3 weeks ago. 2. Left hip pain: started prior to fosamax. Started around 10/10 after getting up from a chair. boil   • Breast mass 1995    nodular mass in breasts s/p implants; 2000-nodular density (simple cyst)   • Cervical lesion 1995   • Chest tightness    • Chest wall pain    • Chickenpox    • Cough    • Diverticulitis 2002, 5/09 4/02-sigmoid diverticulitis with B/L tubal fulguration and cutting      Social History:    Social History    Tobacco Use      Smoking status: Never Smoker      Smokeless tobacco: Never Used    Alcohol use: Yes      Frequency: 2-3 times a week      Drinks per session: 1 or 2      [address] [place] [address] she rai well with stress  Indication for ASA (50-57 yo): no indication for ASA  Colon Cancer Screening: see above. Cervical Cancer Screening: neg cytology/HPV in 8/2016. No need for additional pap smears.    Breast Cancer Screening: cont yearly mammogra

## 2020-12-14 ENCOUNTER — TELEPHONE (OUTPATIENT)
Dept: INTERNAL MEDICINE CLINIC | Facility: CLINIC | Age: 75
End: 2020-12-14

## 2020-12-14 ENCOUNTER — TELEPHONE (OUTPATIENT)
Dept: ORTHOPEDICS CLINIC | Facility: CLINIC | Age: 75
End: 2020-12-14

## 2020-12-14 NOTE — TELEPHONE ENCOUNTER
Pt used Ibuprofen as directed at last OV 11/10 for 4 weeks with relief of pain. D/C use at 4 weeks. Pain has slowly returned, some days worse than others. Not using any ibuprofen at this time.   Provider notes from last OV:    # Acute Left Hip Pain: DDx

## 2020-12-14 NOTE — TELEPHONE ENCOUNTER
Patient is scheduled with Dr. Marianna Castillo this Saturday for L hip pain.  Patient mentioned she is getting xrays done on the 17th will she need additional?

## 2020-12-14 NOTE — TELEPHONE ENCOUNTER
Pt notified that xray has been ordered and referral to Dr Rom Antunez. Pt provided appropriate contact info.

## 2020-12-17 ENCOUNTER — HOSPITAL ENCOUNTER (OUTPATIENT)
Dept: GENERAL RADIOLOGY | Age: 75
Discharge: HOME | End: 2020-12-17
Attending: INTERNAL MEDICINE
Payer: MEDICARE

## 2020-12-17 DIAGNOSIS — M25.552 LEFT HIP PAIN: ICD-10-CM

## 2020-12-17 PROCEDURE — 73502 X-RAY EXAM HIP UNI 2-3 VIEWS: CPT | Performed by: INTERNAL MEDICINE

## 2020-12-19 ENCOUNTER — OFFICE VISIT (OUTPATIENT)
Dept: ORTHOPEDICS CLINIC | Facility: CLINIC | Age: 75
End: 2020-12-19
Payer: MEDICARE

## 2020-12-19 VITALS — OXYGEN SATURATION: 99 % | HEART RATE: 96 BPM

## 2020-12-19 DIAGNOSIS — M76.892 HIP FLEXOR TENDONITIS, LEFT: Primary | ICD-10-CM

## 2020-12-19 PROCEDURE — 99203 OFFICE O/P NEW LOW 30 MIN: CPT | Performed by: ORTHOPAEDIC SURGERY

## 2020-12-19 NOTE — H&P
[facility] New Patient Note    CC: Patient presents with:  Hip Pain: left hip pain       HPI: This 70year old female presents today with complaints of left hip pain. Patient states this is been going on for a few months.   It started when she was do palpitations    • Hematuria 1998, 1999   • High cholesterol     hx high cholesterol/triglycerides   • History of bone density study 9/17/2007   • Hot flashes    • Irregular periods 1995   • Knee pain 2011   • Laboratory examination ordered as part of a rou VITAMIN E 1 tablet daily. • Cyanocobalamin (VITAMIN B 12 OR) Take 1 tablet by mouth daily. Vicodin [Hydrocodon*        Comment:CONSTIPATION.   Family History   Problem Relation Age of Onset   • Cancer Mother         colon cancer at 80   • Cancer perfused lower extremity      Imaging: X-rays of the left hip and pelvis personally viewed, independently interpreted and radiology report read. Demonstrates well-maintained joint space, no degenerative changes, no osteophyte formation.   There is well def

## 2021-01-06 RX ORDER — ALENDRONATE SODIUM 70 MG/1
70 TABLET ORAL WEEKLY
Qty: 12 TABLET | Refills: 3 | OUTPATIENT
Start: 2021-01-06

## 2021-04-13 ENCOUNTER — TELEPHONE (OUTPATIENT)
Dept: INTERNAL MEDICINE CLINIC | Facility: CLINIC | Age: 76
End: 2021-04-13

## 2021-04-13 NOTE — TELEPHONE ENCOUNTER
Pt called asking for a status update on her referral. Pt requested a call when referral is approved. Please advise.      Ph: [phone]

## 2021-09-09 RX ORDER — ALENDRONATE SODIUM 70 MG/1
70 TABLET ORAL WEEKLY
Qty: 12 TABLET | Refills: 0 | Status: SHIPPED | OUTPATIENT
Start: 2021-09-09 | End: 2021-10-13

## 2021-10-01 ENCOUNTER — OFFICE VISIT (OUTPATIENT)
Dept: INTERNAL MEDICINE CLINIC | Facility: CLINIC | Age: 76
End: 2021-10-01
Payer: MEDICARE

## 2021-10-01 VITALS
WEIGHT: 161.63 LBS | TEMPERATURE: 98 F | HEART RATE: 97 BPM | HEIGHT: 63 IN | OXYGEN SATURATION: 97 % | DIASTOLIC BLOOD PRESSURE: 80 MMHG | SYSTOLIC BLOOD PRESSURE: 130 MMHG | BODY MASS INDEX: 28.64 KG/M2

## 2021-10-01 DIAGNOSIS — Z12.31 VISIT FOR SCREENING MAMMOGRAM: ICD-10-CM

## 2021-10-01 DIAGNOSIS — K63.5 SESSILE COLONIC POLYP: ICD-10-CM

## 2021-10-01 DIAGNOSIS — E78.2 MIXED HYPERLIPIDEMIA: ICD-10-CM

## 2021-10-01 DIAGNOSIS — Z00.00 ROUTINE GENERAL MEDICAL EXAMINATION AT A HEALTH CARE FACILITY: Primary | ICD-10-CM

## 2021-10-01 DIAGNOSIS — M46.84 OTHER SPECIFIED INFLAMMATORY SPONDYLOPATHIES, THORACIC REGION (HCC): ICD-10-CM

## 2021-10-01 DIAGNOSIS — M81.0 AGE-RELATED OSTEOPOROSIS WITHOUT CURRENT PATHOLOGICAL FRACTURE: ICD-10-CM

## 2021-10-01 PROCEDURE — 99397 PER PM REEVAL EST PAT 65+ YR: CPT | Performed by: INTERNAL MEDICINE

## 2021-10-01 PROCEDURE — 3079F DIAST BP 80-89 MM HG: CPT | Performed by: INTERNAL MEDICINE

## 2021-10-01 PROCEDURE — G0439 PPPS, SUBSEQ VISIT: HCPCS | Performed by: INTERNAL MEDICINE

## 2021-10-01 PROCEDURE — 3075F SYST BP GE 130 - 139MM HG: CPT | Performed by: INTERNAL MEDICINE

## 2021-10-01 PROCEDURE — 96160 PT-FOCUSED HLTH RISK ASSMT: CPT | Performed by: INTERNAL MEDICINE

## 2021-10-01 PROCEDURE — 3008F BODY MASS INDEX DOCD: CPT | Performed by: INTERNAL MEDICINE

## 2021-10-01 NOTE — PATIENT INSTRUCTIONS
Please complete your fasting labs as soon as possible. I do advise you get the TDAP (tetanus, diptheriae, pertussis) vaccine every 10 years but it can cost up to $65.    I also recommend you get your COVID booster now.     I advise you get the annual fl

## 2021-10-05 ENCOUNTER — LAB ENCOUNTER (OUTPATIENT)
Dept: LAB | Age: 76
End: 2021-10-05
Attending: INTERNAL MEDICINE
Payer: MEDICARE

## 2021-10-05 DIAGNOSIS — M46.84 OTHER SPECIFIED INFLAMMATORY SPONDYLOPATHIES, THORACIC REGION (HCC): ICD-10-CM

## 2021-10-05 DIAGNOSIS — Z00.00 ROUTINE GENERAL MEDICAL EXAMINATION AT A HEALTH CARE FACILITY: ICD-10-CM

## 2021-10-05 DIAGNOSIS — K63.5 SESSILE COLONIC POLYP: ICD-10-CM

## 2021-10-05 DIAGNOSIS — M81.0 AGE-RELATED OSTEOPOROSIS WITHOUT CURRENT PATHOLOGICAL FRACTURE: ICD-10-CM

## 2021-10-05 DIAGNOSIS — E78.2 MIXED HYPERLIPIDEMIA: ICD-10-CM

## 2021-10-05 PROCEDURE — 82306 VITAMIN D 25 HYDROXY: CPT

## 2021-10-05 PROCEDURE — 84450 TRANSFERASE (AST) (SGOT): CPT

## 2021-10-05 PROCEDURE — 84460 ALANINE AMINO (ALT) (SGPT): CPT

## 2021-10-05 PROCEDURE — 36415 COLL VENOUS BLD VENIPUNCTURE: CPT

## 2021-10-05 PROCEDURE — 86803 HEPATITIS C AB TEST: CPT

## 2021-10-05 PROCEDURE — 80048 BASIC METABOLIC PNL TOTAL CA: CPT

## 2021-10-19 ENCOUNTER — HOSPITAL ENCOUNTER (OUTPATIENT)
Dept: MAMMOGRAPHY | Age: 76
Discharge: HOME | End: 2021-10-19
Attending: INTERNAL MEDICINE
Payer: MEDICARE

## 2021-10-19 DIAGNOSIS — Z12.31 VISIT FOR SCREENING MAMMOGRAM: ICD-10-CM

## 2021-10-19 PROCEDURE — 77067 SCR MAMMO BI INCL CAD: CPT | Performed by: INTERNAL MEDICINE

## 2021-10-19 PROCEDURE — 77063 BREAST TOMOSYNTHESIS BI: CPT | Performed by: INTERNAL MEDICINE

## 2021-10-27 ENCOUNTER — HOSPITAL ENCOUNTER (OUTPATIENT)
Dept: MAMMOGRAPHY | Facility: HOSPITAL | Age: 76
Discharge: HOME | End: 2021-10-27
Attending: INTERNAL MEDICINE
Payer: MEDICARE

## 2021-10-27 DIAGNOSIS — R92.2 INCONCLUSIVE MAMMOGRAM: ICD-10-CM

## 2021-10-27 PROCEDURE — 77061 BREAST TOMOSYNTHESIS UNI: CPT | Performed by: INTERNAL MEDICINE

## 2021-10-27 PROCEDURE — 77065 DX MAMMO INCL CAD UNI: CPT | Performed by: INTERNAL MEDICINE

## 2021-11-29 RX ORDER — ALENDRONATE SODIUM 70 MG/1
TABLET ORAL
Qty: 12 TABLET | Refills: 3 | Status: SHIPPED | OUTPATIENT
Start: 2021-11-29

## 2021-11-29 NOTE — TELEPHONE ENCOUNTER
Name from pharmacy: ALENDRONATE 70MG TABLETS         Will file in chart as: ALENDRONATE 70 MG Oral Tab    Sig: TAKE 1 TABLET(70 MG) BY MOUTH 1 TIME A WEEK    Disp:  12 tablet    Refills:  3 (Pharmacy requested: Not specified)    Start: 11/29/2021    Class

## 2022-08-17 ENCOUNTER — OFFICE VISIT (OUTPATIENT)
Dept: INTERNAL MEDICINE CLINIC | Facility: CLINIC | Age: 77
End: 2022-08-17
Payer: MEDICARE

## 2022-08-17 VITALS
SYSTOLIC BLOOD PRESSURE: 120 MMHG | WEIGHT: 155.63 LBS | OXYGEN SATURATION: 97 % | RESPIRATION RATE: 16 BRPM | HEIGHT: 63 IN | DIASTOLIC BLOOD PRESSURE: 70 MMHG | BODY MASS INDEX: 27.57 KG/M2 | HEART RATE: 80 BPM | TEMPERATURE: 98 F

## 2022-08-17 DIAGNOSIS — L57.0 ACTINIC KERATOSIS: ICD-10-CM

## 2022-08-17 DIAGNOSIS — M81.0 AGE-RELATED OSTEOPOROSIS WITHOUT CURRENT PATHOLOGICAL FRACTURE: ICD-10-CM

## 2022-08-17 DIAGNOSIS — M46.84 OTHER SPECIFIED INFLAMMATORY SPONDYLOPATHIES, THORACIC REGION (HCC): ICD-10-CM

## 2022-08-17 DIAGNOSIS — Z00.00 ROUTINE GENERAL MEDICAL EXAMINATION AT A HEALTH CARE FACILITY: Primary | ICD-10-CM

## 2022-08-17 DIAGNOSIS — E78.2 MIXED HYPERLIPIDEMIA: ICD-10-CM

## 2022-08-17 DIAGNOSIS — Z12.31 VISIT FOR SCREENING MAMMOGRAM: ICD-10-CM

## 2022-08-17 DIAGNOSIS — E66.3 OVERWEIGHT (BMI 25.0-29.9): ICD-10-CM

## 2022-08-17 PROCEDURE — 3074F SYST BP LT 130 MM HG: CPT | Performed by: INTERNAL MEDICINE

## 2022-08-17 PROCEDURE — 99397 PER PM REEVAL EST PAT 65+ YR: CPT | Performed by: INTERNAL MEDICINE

## 2022-08-17 PROCEDURE — 3008F BODY MASS INDEX DOCD: CPT | Performed by: INTERNAL MEDICINE

## 2022-08-17 PROCEDURE — 96160 PT-FOCUSED HLTH RISK ASSMT: CPT | Performed by: INTERNAL MEDICINE

## 2022-08-17 PROCEDURE — 1126F AMNT PAIN NOTED NONE PRSNT: CPT | Performed by: INTERNAL MEDICINE

## 2022-08-17 PROCEDURE — G0439 PPPS, SUBSEQ VISIT: HCPCS | Performed by: INTERNAL MEDICINE

## 2022-08-17 PROCEDURE — 3078F DIAST BP <80 MM HG: CPT | Performed by: INTERNAL MEDICINE

## 2022-08-17 RX ORDER — ALENDRONATE SODIUM 70 MG/1
70 TABLET ORAL WEEKLY
Qty: 12 TABLET | Refills: 3 | Status: SHIPPED | OUTPATIENT
Start: 2022-08-17

## 2022-08-17 RX ORDER — ACETAMINOPHEN 160 MG
3000 TABLET,DISINTEGRATING ORAL DAILY
COMMUNITY

## 2022-08-17 NOTE — PATIENT INSTRUCTIONS
I do advise you get the TDAP (tetanus, diptheriae, pertussis) vaccine every 10 years but it can cost up to $65.    I advise you get the annual flu shot every September, October. You are due for your fasting labs, mammogram, and bone x-ray (DEXA) in October, 2022.

## 2022-10-12 ENCOUNTER — LAB ENCOUNTER (OUTPATIENT)
Dept: LAB | Age: 77
End: 2022-10-12
Attending: INTERNAL MEDICINE
Payer: MEDICARE

## 2022-10-12 DIAGNOSIS — M46.84 OTHER SPECIFIED INFLAMMATORY SPONDYLOPATHIES, THORACIC REGION (HCC): ICD-10-CM

## 2022-10-12 DIAGNOSIS — M81.0 AGE-RELATED OSTEOPOROSIS WITHOUT CURRENT PATHOLOGICAL FRACTURE: ICD-10-CM

## 2022-10-12 DIAGNOSIS — Z00.00 ROUTINE GENERAL MEDICAL EXAMINATION AT A HEALTH CARE FACILITY: ICD-10-CM

## 2022-10-12 DIAGNOSIS — Z12.31 VISIT FOR SCREENING MAMMOGRAM: ICD-10-CM

## 2022-10-12 DIAGNOSIS — E78.2 MIXED HYPERLIPIDEMIA: ICD-10-CM

## 2022-10-12 LAB
ALT SERPL-CCNC: 39 U/L
ANION GAP SERPL CALC-SCNC: 6 MMOL/L (ref 0–18)
AST SERPL-CCNC: 28 U/L (ref 15–37)
BUN BLD-MCNC: 20 MG/DL (ref 7–18)
CALCIUM BLD-MCNC: 9.6 MG/DL (ref 8.5–10.1)
CHLORIDE SERPL-SCNC: 107 MMOL/L (ref 98–112)
CHOLEST SERPL-MCNC: 255 MG/DL (ref ?–200)
CO2 SERPL-SCNC: 26 MMOL/L (ref 21–32)
CREAT BLD-MCNC: 0.86 MG/DL
FASTING PATIENT LIPID ANSWER: YES
FASTING STATUS PATIENT QL REPORTED: YES
GFR SERPLBLD BASED ON 1.73 SQ M-ARVRAT: 71 ML/MIN/1.73M2 (ref 60–?)
GLUCOSE BLD-MCNC: 86 MG/DL (ref 70–99)
HDLC SERPL-MCNC: 94 MG/DL (ref 40–59)
LDLC SERPL CALC-MCNC: 147 MG/DL (ref ?–100)
NONHDLC SERPL-MCNC: 161 MG/DL (ref ?–130)
OSMOLALITY SERPL CALC.SUM OF ELEC: 290 MOSM/KG (ref 275–295)
POTASSIUM SERPL-SCNC: 4.5 MMOL/L (ref 3.5–5.1)
SODIUM SERPL-SCNC: 139 MMOL/L (ref 136–145)
TRIGL SERPL-MCNC: 84 MG/DL (ref 30–149)
VIT D+METAB SERPL-MCNC: 58.1 NG/ML (ref 30–100)
VLDLC SERPL CALC-MCNC: 16 MG/DL (ref 0–30)

## 2022-10-12 PROCEDURE — 80061 LIPID PANEL: CPT

## 2022-10-12 PROCEDURE — 84460 ALANINE AMINO (ALT) (SGPT): CPT

## 2022-10-12 PROCEDURE — 84450 TRANSFERASE (AST) (SGOT): CPT

## 2022-10-12 PROCEDURE — 36415 COLL VENOUS BLD VENIPUNCTURE: CPT

## 2022-10-12 PROCEDURE — 82306 VITAMIN D 25 HYDROXY: CPT

## 2022-10-12 PROCEDURE — 80048 BASIC METABOLIC PNL TOTAL CA: CPT

## 2022-10-20 ENCOUNTER — HOSPITAL ENCOUNTER (OUTPATIENT)
Dept: MAMMOGRAPHY | Age: 77
Discharge: HOME | End: 2022-10-20
Attending: INTERNAL MEDICINE
Payer: MEDICARE

## 2022-10-20 ENCOUNTER — HOSPITAL ENCOUNTER (OUTPATIENT)
Dept: BONE DENSITY | Age: 77
Discharge: HOME | End: 2022-10-20
Attending: INTERNAL MEDICINE
Payer: MEDICARE

## 2022-10-20 DIAGNOSIS — M81.0 AGE-RELATED OSTEOPOROSIS WITHOUT CURRENT PATHOLOGICAL FRACTURE: ICD-10-CM

## 2022-10-20 DIAGNOSIS — Z12.31 VISIT FOR SCREENING MAMMOGRAM: ICD-10-CM

## 2022-10-20 PROCEDURE — 77067 SCR MAMMO BI INCL CAD: CPT | Performed by: INTERNAL MEDICINE

## 2022-10-20 PROCEDURE — 77063 BREAST TOMOSYNTHESIS BI: CPT | Performed by: INTERNAL MEDICINE

## 2022-10-20 PROCEDURE — 77080 DXA BONE DENSITY AXIAL: CPT | Performed by: INTERNAL MEDICINE

## 2022-11-17 ENCOUNTER — HOSPITAL ENCOUNTER (OUTPATIENT)
Dept: CT IMAGING | Age: 77
Discharge: HOME | End: 2022-11-17
Attending: INTERNAL MEDICINE

## 2022-11-17 DIAGNOSIS — Z13.6 SCREENING FOR HEART DISEASE: ICD-10-CM

## 2022-11-17 DIAGNOSIS — Z13.6 ENCOUNTER FOR SCREENING FOR CARDIOVASCULAR DISORDERS: ICD-10-CM

## 2022-12-16 ENCOUNTER — TELEPHONE (OUTPATIENT)
Dept: INTERNAL MEDICINE CLINIC | Facility: CLINIC | Age: 77
End: 2022-12-16

## 2022-12-16 DIAGNOSIS — L57.0 ACTINIC KERATOSIS: Primary | ICD-10-CM

## 2022-12-16 DIAGNOSIS — D04.9 SQUAMOUS CELL CARCINOMA IN SITU (SCCIS) OF SKIN: ICD-10-CM

## 2022-12-16 NOTE — TELEPHONE ENCOUNTER
[phone] spoke to pt, informed her referral is placed. Explained how to monitor through [address] Will need to fax this referral once authorized.

## 2022-12-16 NOTE — TELEPHONE ENCOUNTER
Pt seen Dr. Sajan Payne on 12/5 see OV notes   She was informed to f/u with Dr Surendra Valdez to have mohs surgery.     Pt requesting referral.     [address] [place] [address], [address] [place] [address]  Phone: [phone]

## 2023-02-20 ENCOUNTER — LAB ENCOUNTER (OUTPATIENT)
Dept: LAB | Age: 78
End: 2023-02-20
Attending: DERMATOLOGY
Payer: MEDICARE

## 2023-02-20 DIAGNOSIS — C44.92 SCC (SQUAMOUS CELL CARCINOMA): ICD-10-CM

## 2023-02-21 LAB — SARS-COV-2 RNA RESP QL NAA+PROBE: NOT DETECTED

## 2023-06-15 ENCOUNTER — HOSPITAL ENCOUNTER (OUTPATIENT)
Age: 78
Discharge: ANOTHER HOSPITAL | End: 2023-06-15
Payer: MEDICARE

## 2023-06-15 ENCOUNTER — TELEPHONE (OUTPATIENT)
Dept: INTERNAL MEDICINE CLINIC | Facility: CLINIC | Age: 78
End: 2023-06-15

## 2023-06-15 ENCOUNTER — HOSPITAL ENCOUNTER (EMERGENCY)
Facility: HOSPITAL | Age: 78
Discharge: HOME | End: 2023-06-15
Attending: EMERGENCY MEDICINE
Payer: MEDICARE

## 2023-06-15 ENCOUNTER — APPOINTMENT (OUTPATIENT)
Dept: GENERAL RADIOLOGY | Facility: HOSPITAL | Age: 78
End: 2023-06-15
Attending: EMERGENCY MEDICINE
Payer: MEDICARE

## 2023-06-15 VITALS
TEMPERATURE: 98 F | BODY MASS INDEX: 27.82 KG/M2 | HEART RATE: 93 BPM | OXYGEN SATURATION: 94 % | RESPIRATION RATE: 18 BRPM | SYSTOLIC BLOOD PRESSURE: 147 MMHG | WEIGHT: 157 LBS | HEIGHT: 63 IN | DIASTOLIC BLOOD PRESSURE: 83 MMHG

## 2023-06-15 VITALS
HEART RATE: 104 BPM | OXYGEN SATURATION: 96 % | SYSTOLIC BLOOD PRESSURE: 152 MMHG | DIASTOLIC BLOOD PRESSURE: 76 MMHG | TEMPERATURE: 100 F | RESPIRATION RATE: 20 BRPM

## 2023-06-15 DIAGNOSIS — R00.2 PALPITATIONS: ICD-10-CM

## 2023-06-15 DIAGNOSIS — I49.3 PVC (PREMATURE VENTRICULAR CONTRACTION): Primary | ICD-10-CM

## 2023-06-15 DIAGNOSIS — R42 DIZZINESS: ICD-10-CM

## 2023-06-15 DIAGNOSIS — R94.31 ABNORMAL EKG: ICD-10-CM

## 2023-06-15 DIAGNOSIS — H53.9 VISUAL DISTURBANCE: ICD-10-CM

## 2023-06-15 DIAGNOSIS — R00.2 PALPITATIONS: Primary | ICD-10-CM

## 2023-06-15 LAB
ALBUMIN SERPL-MCNC: 3.9 G/DL (ref 3.4–5)
ALBUMIN/GLOB SERPL: 1.1 {RATIO} (ref 1–2)
ALP LIVER SERPL-CCNC: 58 U/L
ALT SERPL-CCNC: 31 U/L
ANION GAP SERPL CALC-SCNC: 5 MMOL/L (ref 0–18)
AST SERPL-CCNC: 36 U/L (ref 15–37)
ATRIAL RATE: 102 BPM
ATRIAL RATE: 92 BPM
BASOPHILS # BLD AUTO: 0.04 X10(3) UL (ref 0–0.2)
BASOPHILS NFR BLD AUTO: 0.7 %
BILIRUB SERPL-MCNC: 0.3 MG/DL (ref 0.1–2)
BUN BLD-MCNC: 22 MG/DL (ref 7–18)
CALCIUM BLD-MCNC: 9.7 MG/DL (ref 8.5–10.1)
CHLORIDE SERPL-SCNC: 106 MMOL/L (ref 98–112)
CO2 SERPL-SCNC: 27 MMOL/L (ref 21–32)
CREAT BLD-MCNC: 0.86 MG/DL
EOSINOPHIL # BLD AUTO: 0.04 X10(3) UL (ref 0–0.7)
EOSINOPHIL NFR BLD AUTO: 0.7 %
ERYTHROCYTE [DISTWIDTH] IN BLOOD BY AUTOMATED COUNT: 11.7 %
GFR SERPLBLD BASED ON 1.73 SQ M-ARVRAT: 70 ML/MIN/1.73M2 (ref 60–?)
GLOBULIN PLAS-MCNC: 3.5 G/DL (ref 2.8–4.4)
GLUCOSE BLD-MCNC: 88 MG/DL (ref 70–99)
GLUCOSE BLD-MCNC: 94 MG/DL (ref 70–99)
HCT VFR BLD AUTO: 42.1 %
HGB BLD-MCNC: 15 G/DL
IMM GRANULOCYTES # BLD AUTO: 0.02 X10(3) UL (ref 0–1)
IMM GRANULOCYTES NFR BLD: 0.3 %
LYMPHOCYTES # BLD AUTO: 1.83 X10(3) UL (ref 1–4)
LYMPHOCYTES NFR BLD AUTO: 30.6 %
MAGNESIUM SERPL-MCNC: 2.5 MG/DL (ref 1.6–2.6)
MCH RBC QN AUTO: 31.3 PG (ref 26–34)
MCHC RBC AUTO-ENTMCNC: 35.6 G/DL (ref 31–37)
MCV RBC AUTO: 87.9 FL
MONOCYTES # BLD AUTO: 0.4 X10(3) UL (ref 0.1–1)
MONOCYTES NFR BLD AUTO: 6.7 %
NEUTROPHILS # BLD AUTO: 3.65 X10 (3) UL (ref 1.5–7.7)
NEUTROPHILS # BLD AUTO: 3.65 X10(3) UL (ref 1.5–7.7)
NEUTROPHILS NFR BLD AUTO: 61 %
OSMOLALITY SERPL CALC.SUM OF ELEC: 289 MOSM/KG (ref 275–295)
P AXIS: 65 DEGREES
P AXIS: 65 DEGREES
P-R INTERVAL: 182 MS
P-R INTERVAL: 188 MS
PLATELET # BLD AUTO: 205 10(3)UL (ref 150–450)
POTASSIUM SERPL-SCNC: 3.7 MMOL/L (ref 3.5–5.1)
PROT SERPL-MCNC: 7.4 G/DL (ref 6.4–8.2)
Q-T INTERVAL: 362 MS
Q-T INTERVAL: 366 MS
QRS DURATION: 72 MS
QRS DURATION: 72 MS
QTC CALCULATION (BEZET): 447 MS
QTC CALCULATION (BEZET): 477 MS
R AXIS: -14 DEGREES
R AXIS: -20 DEGREES
RBC # BLD AUTO: 4.79 X10(6)UL
SODIUM SERPL-SCNC: 138 MMOL/L (ref 136–145)
T AXIS: 61 DEGREES
T AXIS: 69 DEGREES
TROPONIN I HIGH SENSITIVITY: 3 NG/L
TSI SER-ACNC: 3.34 MIU/ML (ref 0.36–3.74)
VENTRICULAR RATE: 102 BPM
VENTRICULAR RATE: 92 BPM
WBC # BLD AUTO: 6 X10(3) UL (ref 4–11)

## 2023-06-15 PROCEDURE — 84484 ASSAY OF TROPONIN QUANT: CPT | Performed by: EMERGENCY MEDICINE

## 2023-06-15 PROCEDURE — 83735 ASSAY OF MAGNESIUM: CPT | Performed by: EMERGENCY MEDICINE

## 2023-06-15 PROCEDURE — 93000 ELECTROCARDIOGRAM COMPLETE: CPT | Performed by: NURSE PRACTITIONER

## 2023-06-15 PROCEDURE — 80053 COMPREHEN METABOLIC PANEL: CPT | Performed by: EMERGENCY MEDICINE

## 2023-06-15 PROCEDURE — 93005 ELECTROCARDIOGRAM TRACING: CPT

## 2023-06-15 PROCEDURE — 99284 EMERGENCY DEPT VISIT MOD MDM: CPT

## 2023-06-15 PROCEDURE — 36415 COLL VENOUS BLD VENIPUNCTURE: CPT

## 2023-06-15 PROCEDURE — 71046 X-RAY EXAM CHEST 2 VIEWS: CPT | Performed by: EMERGENCY MEDICINE

## 2023-06-15 PROCEDURE — 85025 COMPLETE CBC W/AUTO DIFF WBC: CPT | Performed by: EMERGENCY MEDICINE

## 2023-06-15 PROCEDURE — 99215 OFFICE O/P EST HI 40 MIN: CPT | Performed by: NURSE PRACTITIONER

## 2023-06-15 PROCEDURE — 85025 COMPLETE CBC W/AUTO DIFF WBC: CPT

## 2023-06-15 PROCEDURE — 82962 GLUCOSE BLOOD TEST: CPT

## 2023-06-15 PROCEDURE — 84443 ASSAY THYROID STIM HORMONE: CPT | Performed by: EMERGENCY MEDICINE

## 2023-06-15 PROCEDURE — 80053 COMPREHEN METABOLIC PANEL: CPT

## 2023-06-15 PROCEDURE — 84484 ASSAY OF TROPONIN QUANT: CPT

## 2023-06-15 PROCEDURE — 93010 ELECTROCARDIOGRAM REPORT: CPT

## 2023-06-15 PROCEDURE — 99285 EMERGENCY DEPT VISIT HI MDM: CPT

## 2023-06-15 RX ORDER — SODIUM CHLORIDE 9 MG/ML
INJECTION, SOLUTION INTRAVENOUS ONCE
Status: DISCONTINUED | OUTPATIENT
Start: 2023-06-15 | End: 2023-06-15

## 2023-06-15 NOTE — ED INITIAL ASSESSMENT (HPI)
Pt c/o intermittent palpitations x 2 weeks, denies c/o chest pain, no right lateral neck tightness yesterday.

## 2023-06-15 NOTE — ED QUICK NOTES
Pt instructed to go straight to ER by provider, pt agrees to plan of care . Pt and family verbalized understanding .

## 2023-06-15 NOTE — TELEPHONE ENCOUNTER
KS - condition update, ALYSIA    RN received urgent triage call. Pt experiencing sob and palpitations for about 2 weeks, dizzy spells and feels winded now. Pt states 30 years ago she had a full work-up about the palpitations and nothing was found. She got used to them on and off, they always went away. Now they are hanging around longer, getting stronger, and sometimes pt feels an ache at the back of her neck that goes up to her head. No appointments available with any provider today. Pt in no acute distress during the phone call, she stated sometimes \"It just feels like I need to take a bigger breath than normal.\"     RN recommended IC or ED. Pt said the [facility] her. [facility] web site says there is a 4 minute wait at that location. Pt is going to go in for evaluation. RN recommended having someone drive her. Pt v/u.

## 2023-06-16 ENCOUNTER — PATIENT OUTREACH (OUTPATIENT)
Dept: CASE MANAGEMENT | Age: 78
End: 2023-06-16

## 2023-06-19 NOTE — PROGRESS NOTES
2nd attempt ED f/up apt request  PCP -decline, pt wants to see JEFF Lu  [address]  4th floor  [address] [facility]  [phone]  Apt: June 22 @10:40am     Confirmed w/ pt  Closing encounter

## 2023-06-21 ENCOUNTER — TELEPHONE (OUTPATIENT)
Dept: ADMINISTRATIVE | Age: 78
End: 2023-06-21

## 2023-06-21 DIAGNOSIS — R00.2 HEART PALPITATIONS: Primary | ICD-10-CM

## 2023-06-22 NOTE — TELEPHONE ENCOUNTER
Referral was placed as \"Urgent\" - is in open status. Called [facility] spoke to [place]. Informed her that the referral has been placed, we will fax it over to them as soon as it is authorized. Pt is there and has been roomed.

## 2023-06-22 NOTE — TELEPHONE ENCOUNTER
KS - pended referral in critical status, should auto-authorize, please route back so can be faxed to [facility].  Ty!

## 2023-06-22 NOTE — TELEPHONE ENCOUNTER
Pt as pending orders for cardiology, needs to referral for [facility]    Pt has an appt today at 10:45, informed pt referral will more likely not be ready in time but we will send it urgently.

## 2023-06-23 NOTE — TELEPHONE ENCOUNTER
6/22/2023 12:20 PM CDT Ranulfo Preston [[id number]] Ankur Matos MD [[id number]] [facility] Address [place] [address]  Phone: [phone]  Fax: [phone]

## 2023-09-13 ENCOUNTER — OFFICE VISIT (OUTPATIENT)
Dept: INTERNAL MEDICINE CLINIC | Facility: CLINIC | Age: 78
End: 2023-09-13
Payer: MEDICARE

## 2023-09-13 VITALS
BODY MASS INDEX: 28.74 KG/M2 | OXYGEN SATURATION: 97 % | TEMPERATURE: 98 F | HEIGHT: 62.75 IN | RESPIRATION RATE: 16 BRPM | DIASTOLIC BLOOD PRESSURE: 82 MMHG | SYSTOLIC BLOOD PRESSURE: 140 MMHG | WEIGHT: 160.19 LBS | HEART RATE: 80 BPM

## 2023-09-13 DIAGNOSIS — Z00.00 ROUTINE GENERAL MEDICAL EXAMINATION AT A HEALTH CARE FACILITY: Primary | ICD-10-CM

## 2023-09-13 DIAGNOSIS — M46.84 OTHER SPECIFIED INFLAMMATORY SPONDYLOPATHIES, THORACIC REGION (HCC): ICD-10-CM

## 2023-09-13 DIAGNOSIS — E66.3 OVERWEIGHT (BMI 25.0-29.9): ICD-10-CM

## 2023-09-13 DIAGNOSIS — K63.5 SESSILE COLONIC POLYP: ICD-10-CM

## 2023-09-13 DIAGNOSIS — E78.2 MIXED HYPERLIPIDEMIA: ICD-10-CM

## 2023-09-13 DIAGNOSIS — M81.0 AGE-RELATED OSTEOPOROSIS WITHOUT CURRENT PATHOLOGICAL FRACTURE: ICD-10-CM

## 2023-09-13 DIAGNOSIS — Z12.31 VISIT FOR SCREENING MAMMOGRAM: ICD-10-CM

## 2023-09-13 DIAGNOSIS — Z12.83 SCREENING FOR SKIN CANCER: ICD-10-CM

## 2023-09-13 PROCEDURE — 1126F AMNT PAIN NOTED NONE PRSNT: CPT | Performed by: INTERNAL MEDICINE

## 2023-09-13 PROCEDURE — 1159F MED LIST DOCD IN RCRD: CPT | Performed by: INTERNAL MEDICINE

## 2023-09-13 PROCEDURE — 3077F SYST BP >= 140 MM HG: CPT | Performed by: INTERNAL MEDICINE

## 2023-09-13 PROCEDURE — 3079F DIAST BP 80-89 MM HG: CPT | Performed by: INTERNAL MEDICINE

## 2023-09-13 PROCEDURE — 1160F RVW MEDS BY RX/DR IN RCRD: CPT | Performed by: INTERNAL MEDICINE

## 2023-09-13 PROCEDURE — 3008F BODY MASS INDEX DOCD: CPT | Performed by: INTERNAL MEDICINE

## 2023-09-13 PROCEDURE — G0439 PPPS, SUBSEQ VISIT: HCPCS | Performed by: INTERNAL MEDICINE

## 2023-09-13 PROCEDURE — 1170F FXNL STATUS ASSESSED: CPT | Performed by: INTERNAL MEDICINE

## 2023-09-13 PROCEDURE — 99214 OFFICE O/P EST MOD 30 MIN: CPT | Performed by: INTERNAL MEDICINE

## 2023-09-13 PROCEDURE — 96160 PT-FOCUSED HLTH RISK ASSMT: CPT | Performed by: INTERNAL MEDICINE

## 2023-09-13 RX ORDER — ALENDRONATE SODIUM 70 MG/1
70 TABLET ORAL WEEKLY
Qty: 12 TABLET | Refills: 3 | Status: SHIPPED | OUTPATIENT
Start: 2023-09-13

## 2023-09-13 RX ORDER — METOPROLOL SUCCINATE 50 MG/1
50 TABLET, EXTENDED RELEASE ORAL DAILY
COMMUNITY
Start: 2023-08-22

## 2023-09-25 ENCOUNTER — PATIENT MESSAGE (OUTPATIENT)
Dept: INTERNAL MEDICINE CLINIC | Facility: CLINIC | Age: 78
End: 2023-09-25

## 2023-09-25 NOTE — TELEPHONE ENCOUNTER
From: Queenie Somers  To: Jill Dominguez  Sent: 9/25/2023 9:34 AM CDT  Subject: Blood pressure monitoring     Dr Jean Moment   Here are the results of my blood pressure monitoring. I purchased the OMRON Silver upper arm by monitor.   Brett Pineda  9/18/23 9:30 am. 118 76 71  9/19/23. 9:30 am. 121 73 77  9/20/23. 3:00 pm. 118. 68. 78  9/21/23. 9:00 am. 126. 76 71  9/22/23. 9:00 am 123. 76 75  9/24/23. 10:30 am 121. 76. 82  9/25/23. 9:00 am. 123 72. 72

## 2023-10-26 ENCOUNTER — HOSPITAL ENCOUNTER (OUTPATIENT)
Dept: MAMMOGRAPHY | Age: 78
Discharge: HOME | End: 2023-10-26
Attending: INTERNAL MEDICINE

## 2023-10-26 DIAGNOSIS — Z12.31 VISIT FOR SCREENING MAMMOGRAM: ICD-10-CM

## 2023-10-26 PROCEDURE — 77063 BREAST TOMOSYNTHESIS BI: CPT | Performed by: INTERNAL MEDICINE

## 2023-10-26 PROCEDURE — 77067 SCR MAMMO BI INCL CAD: CPT | Performed by: INTERNAL MEDICINE

## 2023-11-07 ENCOUNTER — HOSPITAL ENCOUNTER (OUTPATIENT)
Dept: MAMMOGRAPHY | Facility: HOSPITAL | Age: 78
Discharge: HOME | End: 2023-11-07
Attending: INTERNAL MEDICINE
Payer: MEDICARE

## 2023-11-07 DIAGNOSIS — R92.2 INCONCLUSIVE MAMMOGRAM: ICD-10-CM

## 2023-11-07 PROCEDURE — 77061 BREAST TOMOSYNTHESIS UNI: CPT | Performed by: INTERNAL MEDICINE

## 2023-11-07 PROCEDURE — 76642 ULTRASOUND BREAST LIMITED: CPT | Performed by: INTERNAL MEDICINE

## 2023-11-07 PROCEDURE — 77065 DX MAMMO INCL CAD UNI: CPT | Performed by: INTERNAL MEDICINE

## 2023-12-01 ENCOUNTER — LAB ENCOUNTER (OUTPATIENT)
Dept: LAB | Age: 78
End: 2023-12-01
Attending: INTERNAL MEDICINE
Payer: MEDICARE

## 2023-12-01 DIAGNOSIS — M81.0 AGE-RELATED OSTEOPOROSIS WITHOUT CURRENT PATHOLOGICAL FRACTURE: ICD-10-CM

## 2023-12-01 DIAGNOSIS — Z00.00 ROUTINE GENERAL MEDICAL EXAMINATION AT A HEALTH CARE FACILITY: ICD-10-CM

## 2023-12-01 LAB
ALT SERPL-CCNC: 41 U/L
ANION GAP SERPL CALC-SCNC: 2 MMOL/L (ref 0–18)
AST SERPL-CCNC: 34 U/L (ref 15–37)
BUN BLD-MCNC: 15 MG/DL (ref 9–23)
CALCIUM BLD-MCNC: 9.6 MG/DL (ref 8.5–10.1)
CHLORIDE SERPL-SCNC: 105 MMOL/L (ref 98–112)
CHOLEST SERPL-MCNC: 235 MG/DL (ref ?–200)
CO2 SERPL-SCNC: 31 MMOL/L (ref 21–32)
CREAT BLD-MCNC: 0.98 MG/DL
EGFRCR SERPLBLD CKD-EPI 2021: 60 ML/MIN/1.73M2 (ref 60–?)
FASTING PATIENT LIPID ANSWER: YES
FASTING STATUS PATIENT QL REPORTED: YES
GLUCOSE BLD-MCNC: 88 MG/DL (ref 70–99)
HDLC SERPL-MCNC: 92 MG/DL (ref 40–59)
LDLC SERPL CALC-MCNC: 125 MG/DL (ref ?–100)
NONHDLC SERPL-MCNC: 143 MG/DL (ref ?–130)
OSMOLALITY SERPL CALC.SUM OF ELEC: 286 MOSM/KG (ref 275–295)
POTASSIUM SERPL-SCNC: 3.8 MMOL/L (ref 3.5–5.1)
SODIUM SERPL-SCNC: 138 MMOL/L (ref 136–145)
TRIGL SERPL-MCNC: 104 MG/DL (ref 30–149)
VIT D+METAB SERPL-MCNC: 44.8 NG/ML (ref 30–100)
VLDLC SERPL CALC-MCNC: 18 MG/DL (ref 0–30)

## 2023-12-01 PROCEDURE — 36415 COLL VENOUS BLD VENIPUNCTURE: CPT

## 2023-12-01 PROCEDURE — 84460 ALANINE AMINO (ALT) (SGPT): CPT

## 2023-12-01 PROCEDURE — 84450 TRANSFERASE (AST) (SGOT): CPT

## 2023-12-01 PROCEDURE — 82306 VITAMIN D 25 HYDROXY: CPT

## 2023-12-01 PROCEDURE — 80061 LIPID PANEL: CPT

## 2023-12-01 PROCEDURE — 80048 BASIC METABOLIC PNL TOTAL CA: CPT

## 2023-12-13 ENCOUNTER — OFFICE VISIT (OUTPATIENT)
Dept: INTERNAL MEDICINE CLINIC | Facility: CLINIC | Age: 78
End: 2023-12-13
Payer: MEDICARE

## 2023-12-13 VITALS
SYSTOLIC BLOOD PRESSURE: 120 MMHG | HEART RATE: 85 BPM | WEIGHT: 161.38 LBS | RESPIRATION RATE: 16 BRPM | OXYGEN SATURATION: 99 % | DIASTOLIC BLOOD PRESSURE: 70 MMHG | HEIGHT: 62.75 IN | TEMPERATURE: 98 F | BODY MASS INDEX: 28.95 KG/M2

## 2023-12-13 DIAGNOSIS — R00.2 PALPITATIONS: ICD-10-CM

## 2023-12-13 DIAGNOSIS — M81.0 AGE-RELATED OSTEOPOROSIS WITHOUT CURRENT PATHOLOGICAL FRACTURE: Primary | ICD-10-CM

## 2023-12-13 DIAGNOSIS — Z80.0 FAMILY HISTORY OF COLON CANCER: ICD-10-CM

## 2023-12-13 DIAGNOSIS — Z00.00 LABORATORY EXAM ORDERED AS PART OF ROUTINE GENERAL MEDICAL EXAMINATION: ICD-10-CM

## 2023-12-13 DIAGNOSIS — E78.2 MIXED HYPERLIPIDEMIA: ICD-10-CM

## 2023-12-13 DIAGNOSIS — K63.5 SESSILE COLONIC POLYP: ICD-10-CM

## 2023-12-13 DIAGNOSIS — R79.89 HIGH SERUM VITAMIN D: ICD-10-CM

## 2023-12-13 NOTE — PATIENT INSTRUCTIONS
I recommend the following vaccines -  RSV vaccine as soon as possible. TDAP (tetanus, diptheriae, pertussis) vaccine every 10 years. You are due for your fasting labs in 9.2024 and see me 1 week later.

## 2024-04-15 ENCOUNTER — TELEPHONE (OUTPATIENT)
Dept: INTERNAL MEDICINE CLINIC | Facility: CLINIC | Age: 79
End: 2024-04-15

## 2024-04-15 DIAGNOSIS — Z12.83 SCREENING FOR SKIN CANCER: Primary | ICD-10-CM

## 2024-04-15 NOTE — TELEPHONE ENCOUNTER
Patient called to request referral for     Dermatology  For 6 month skin cancer screening   2 visits - Patient is seen every 6 months  Dr. Davi Moura MD  NPI: [id number]  [address] [address]    Patient has appointment on 6/12/24  Active and verified [employer] insurance on file.   No active or pending referral on file.

## 2024-08-05 RX ORDER — ALENDRONATE SODIUM 70 MG/1
70 TABLET ORAL WEEKLY
Qty: 12 TABLET | Refills: 3 | Status: SHIPPED | OUTPATIENT
Start: 2024-08-05

## 2024-08-21 ENCOUNTER — LAB ENCOUNTER (OUTPATIENT)
Dept: LAB | Age: 79
End: 2024-08-21
Attending: INTERNAL MEDICINE
Payer: MEDICARE

## 2024-08-21 DIAGNOSIS — M81.0 AGE-RELATED OSTEOPOROSIS WITHOUT CURRENT PATHOLOGICAL FRACTURE: ICD-10-CM

## 2024-08-21 DIAGNOSIS — Z00.00 LABORATORY EXAM ORDERED AS PART OF ROUTINE GENERAL MEDICAL EXAMINATION: ICD-10-CM

## 2024-08-21 LAB
ALT SERPL-CCNC: 27 U/L
ANION GAP SERPL CALC-SCNC: 4 MMOL/L (ref 0–18)
AST SERPL-CCNC: 35 U/L (ref ?–34)
BUN BLD-MCNC: 15 MG/DL (ref 9–23)
CALCIUM BLD-MCNC: 10.2 MG/DL (ref 8.7–10.4)
CHLORIDE SERPL-SCNC: 106 MMOL/L (ref 98–112)
CHOLEST SERPL-MCNC: 219 MG/DL (ref ?–200)
CO2 SERPL-SCNC: 28 MMOL/L (ref 21–32)
CREAT BLD-MCNC: 0.91 MG/DL
EGFRCR SERPLBLD CKD-EPI 2021: 65 ML/MIN/1.73M2 (ref 60–?)
FASTING PATIENT LIPID ANSWER: YES
FASTING STATUS PATIENT QL REPORTED: YES
GLUCOSE BLD-MCNC: 97 MG/DL (ref 70–99)
HDLC SERPL-MCNC: 81 MG/DL (ref 40–59)
LDLC SERPL CALC-MCNC: 120 MG/DL (ref ?–100)
NONHDLC SERPL-MCNC: 138 MG/DL (ref ?–130)
OSMOLALITY SERPL CALC.SUM OF ELEC: 287 MOSM/KG (ref 275–295)
POTASSIUM SERPL-SCNC: 4.1 MMOL/L (ref 3.5–5.1)
SODIUM SERPL-SCNC: 138 MMOL/L (ref 136–145)
TRIGL SERPL-MCNC: 105 MG/DL (ref 30–149)
VIT D+METAB SERPL-MCNC: 54.2 NG/ML (ref 30–100)
VLDLC SERPL CALC-MCNC: 18 MG/DL (ref 0–30)

## 2024-08-21 PROCEDURE — 84450 TRANSFERASE (AST) (SGOT): CPT

## 2024-08-21 PROCEDURE — 80048 BASIC METABOLIC PNL TOTAL CA: CPT

## 2024-08-21 PROCEDURE — 84460 ALANINE AMINO (ALT) (SGPT): CPT

## 2024-08-21 PROCEDURE — 82306 VITAMIN D 25 HYDROXY: CPT

## 2024-08-21 PROCEDURE — 80061 LIPID PANEL: CPT

## 2024-08-21 PROCEDURE — 36415 COLL VENOUS BLD VENIPUNCTURE: CPT

## 2024-09-09 ENCOUNTER — OFFICE VISIT (OUTPATIENT)
Dept: INTERNAL MEDICINE CLINIC | Facility: CLINIC | Age: 79
End: 2024-09-09
Payer: MEDICARE

## 2024-09-09 VITALS
TEMPERATURE: 98 F | HEART RATE: 78 BPM | RESPIRATION RATE: 16 BRPM | WEIGHT: 163.38 LBS | DIASTOLIC BLOOD PRESSURE: 78 MMHG | BODY MASS INDEX: 29.31 KG/M2 | SYSTOLIC BLOOD PRESSURE: 126 MMHG | HEIGHT: 62.76 IN | OXYGEN SATURATION: 97 %

## 2024-09-09 DIAGNOSIS — M81.0 AGE-RELATED OSTEOPOROSIS WITHOUT CURRENT PATHOLOGICAL FRACTURE: ICD-10-CM

## 2024-09-09 DIAGNOSIS — Z00.00 ROUTINE GENERAL MEDICAL EXAMINATION AT A HEALTH CARE FACILITY: Primary | ICD-10-CM

## 2024-09-09 DIAGNOSIS — E78.2 MIXED HYPERLIPIDEMIA: ICD-10-CM

## 2024-09-09 DIAGNOSIS — E66.3 OVERWEIGHT (BMI 25.0-29.9): ICD-10-CM

## 2024-09-09 DIAGNOSIS — Z80.0 FAMILY HISTORY OF COLON CANCER: ICD-10-CM

## 2024-09-09 DIAGNOSIS — Z12.31 VISIT FOR SCREENING MAMMOGRAM: ICD-10-CM

## 2024-09-09 DIAGNOSIS — R00.2 PALPITATIONS: ICD-10-CM

## 2024-09-09 DIAGNOSIS — L57.0 ACTINIC KERATOSIS: ICD-10-CM

## 2024-09-09 PROBLEM — K63.5 SESSILE COLONIC POLYP: Status: RESOLVED | Noted: 2018-07-10 | Resolved: 2024-09-09

## 2024-09-09 NOTE — PROGRESS NOTES
Kaylee Byrd is a 78 year old female.     HPI:   Patient presents for the following issues and MAS. Denies health concerns.   Palpitations - worsens when watching political show. Otherwise, well controlled.  \"I feel like I have a sugar addiction\" - she eats a small sweet treat a day and feels good. She walks every day. She also eats balanced nutrition.   Goals of care - she lives alone. IADLs. She has support. Daughter lives 10 minutes away.   Osteoporosis - doing well on alendronate  She brings in a log of her home pressures and they are < 130/80s.     REVIEW OF SYSTEMS:   GENERAL HEALTH: feels well otherwise. No f/c  NEURO: denies any headaches, LH, dizzyness, LOC, falls  VISION: denies any blurred or double vision  RESPIRATORY: denies shortness of breath, cough, or congestion  CARDIOVASCULAR: denies chest pain, pressure   GI: denies abdominal pain, constipation, diarrhea, n/v, BRBPR, melena  : no dysuria  or hematuria or vaginal bleeding  SKIN: denies any unusual skin lesions or rashes. Follows closely with dermatoloigy.   PSYCH: mood is stable. Denies depression or anxiety.   EXT: denies edema         Wt Readings from Last 6 Encounters:   09/09/24 163 lb 6.4 oz (74.1 kg)   12/13/23 161 lb 6.4 oz (73.2 kg)   09/13/23 160 lb 3.2 oz (72.7 kg)   06/15/23 157 lb (71.2 kg)   08/17/22 155 lb 9.6 oz (70.6 kg)   10/01/21 161 lb 9.6 oz (73.3 kg)       Allergies   Allergen Reactions    Vicodin [Hydrocodone-Acetaminophen]      CONSTIPATION.        Family History   Problem Relation Age of Onset    Cancer Mother         colon cancer at 81    Cancer Sister         colon cancer    Cancer Other         colon ca, mat uncle    Other (Allergies/Asthma) Brother     Hypertension Son     Dementia Sister         diagnosed 2022 - 84 years old      Past Medical History:    Abdominal pain    Acute pharyngitis    viral    Boil    small vaginal boil    Breast mass    nodular mass in breasts s/p implants; 2000-nodular density  (simple cyst)    Cervical lesion    Chest tightness    Chest wall pain    Chickenpox    Cough    Diverticulitis    -sigmoid diverticulitis    Diverticulosis    and hyperplastic polyp    Diverticulosis    and hyperplastic polyp     DUB (dysfunctional uterine bleeding)    DUB/anovulatory bleeding    H/O breast augmentation    Heart palpitations    Hematuria    High cholesterol    hx high cholesterol/triglycerides    History of bone density study    Hot flashes    Hyperlipidemia    Irregular periods    Knee pain    Laboratory examination ordered as part of a routine general medical examination    Laceration of finger, right    Measles    Medial meniscus tear    Mole (skin)    moles on back-changing    Mumps    Muscle strain    Osteoarthrosis, localized, primary, knee    Osteopenia    Pleurisy    Rib pain    RLQ abdominal pain    Sebaceous cyst    vulvar    Sedentary lifestyle    URI (upper respiratory infection)    Wheezing    Work-related stress      Past Surgical History:   Procedure Laterality Date    Breast surgery procedure unlisted      breast augmentation/saline    Colonoscopy  , , 2009    diverticulosis    Colonoscopy,biopsy  09    Performed by INOCENCIO MELÉNDEZ at [facility]    Colonoscopy,diagnostic  2012    Procedure: COLONOSCOPY, POSSIBLE BIOPSY, POSSIBLE POLYPECTOMY 32363;  Surgeon: Inocencio Meléndez MD;  Location: [facility]    Knee replacement surgery  10/17/12    R TKA          Other surgical history Left     shoulder surgery    Other surgical history      mole removal x2 on back    Other surgical history  12/10/12    left knee manipulation     Skin surgery  2019    Tubal ligation  1989    Laparoscopy with B/L tubal fulguration and cutting      Social History:    Social History     Socioeconomic History    Marital status:    Tobacco Use    Smoking status: Never    Smokeless tobacco: Never   Vaping Use    Vaping status: Never  Used   Substance and Sexual Activity    Alcohol use: Yes     Alcohol/week: 1.0 standard drink of alcohol     Types: 1 Glasses of wine per week     Comment: 3 drinks/month    Drug use: No   Other Topics Concern    Caffeine Concern No    Stress Concern No    Weight Concern Yes    Special Diet No    Exercise Yes    Seat Belt No               EXAM:   /78 (BP Location: Right arm, Patient Position: Sitting, Cuff Size: adult)   Pulse 78   Temp 98 °F (36.7 °C) (Temporal)   Resp 16   Ht 5' 2.76\" (1.594 m)   Wt 163 lb 6.4 oz (74.1 kg)   SpO2 97%   BMI 29.17 kg/m²   GENERAL: A&O well developed, well nourished,in no apparent distress  SKIN: no rashes,no suspicious lesions  HEENT: atraumatic, MMM, throat is clear  NECK: supple, no jvd, no thyromegaly  LUNGS: clear to auscultation bilateraly, no c/w/r  CARDIO: RRR without g/m/r  GI: soft non tender nondistended no hsm bs throughout  NEURO: CN 2-12 grossly intact  PSYCH: pleasant  EXTREMITIES: no cyanosis, clubbing or edema        ASSESSMENT AND PLAN:   # Palpitations 2/2 PVCs: significantly improved on metoprolol 50 mg. Home bp monitor is accurate.   # Sessile Colon Polyp: colonoscopy on 2/2024 by Inocencio Mooney without any polyps. Repeat in 5 years b/c of strong FHx (2029)  # Age Related Osteoporosis: DEXA in 2020 is high risk for hip fracture. started bisphosphonate therapy in 10/2020 with improved density on DEXA in 2022. Cont at least 5 years of bisphosphonate therapy (consider drug holiday in 10/2025)  - cont dietary calcium/vit D3, weight bearing exercises and fall prevention.   # Mixed HLP and Overweight: her 10 year ASCVD score is 19% but she declines statin therapy. Cont to focus on lifestyle measures.   - CT calcium score 0 in 2022  - Counseled on healthy plant based nutrition, regular exercise, and practicing mindfulness.    # Inflammatory spondylopathy of thoracic region: continue HEP. No pain.   # Actinic Keratosis: following with dermatology.   # Health  Maintenance: medicare supervisit 9/2024  Stress Management: she feels she rai well with stress  Indication for ASA (50-58 yo): no indication for ASA  Colon Cancer Screening: see above.   Breast Cancer Screening: performed shared clinical decision-making and she is not sure what she wants to do. Mammogram order is in for now.   Bone Health/Fall Prevention ([place] Chi): see above  Vaccines:  Up to date with shingrix and pneumonia series. Recommended TDAP, covid and flu.   Hep C screening (born [birth year]): Hep C Ab negative in 2017  Healthcare Living Will and Medical POA: daughterKiana is her POA. Full code but no prolonged life support.      The patient indicates understanding of these issues and agrees to the plan.  The patient is asked to return in 1 year for MASCresencio Martinez MD

## 2024-10-29 ENCOUNTER — TELEPHONE (OUTPATIENT)
Dept: INTERNAL MEDICINE CLINIC | Facility: CLINIC | Age: 79
End: 2024-10-29

## 2024-10-29 ENCOUNTER — HOSPITAL ENCOUNTER (OUTPATIENT)
Dept: MAMMOGRAPHY | Age: 79
Discharge: HOME | End: 2024-10-29
Attending: INTERNAL MEDICINE
Payer: MEDICARE

## 2024-10-29 DIAGNOSIS — Z12.31 VISIT FOR SCREENING MAMMOGRAM: ICD-10-CM

## 2024-10-29 DIAGNOSIS — H26.9 CATARACT, UNSPECIFIED CATARACT TYPE, UNSPECIFIED LATERALITY: Primary | ICD-10-CM

## 2024-10-29 PROCEDURE — 77063 BREAST TOMOSYNTHESIS BI: CPT | Performed by: INTERNAL MEDICINE

## 2024-10-29 PROCEDURE — 77067 SCR MAMMO BI INCL CAD: CPT | Performed by: INTERNAL MEDICINE

## 2024-10-29 NOTE — TELEPHONE ENCOUNTER
KS: Pt requesting for a referral for Dr. Brenda Townsend for cataract surgery. Pended referral, please sign if agree, TY!

## 2024-12-30 ENCOUNTER — HOSPITAL ENCOUNTER (OUTPATIENT)
Facility: HOSPITAL | Age: 79
Setting detail: OBSERVATION
Discharge: HOME | End: 2024-12-30
Attending: EMERGENCY MEDICINE | Admitting: STUDENT IN AN ORGANIZED HEALTH CARE EDUCATION/TRAINING PROGRAM
Payer: MEDICARE

## 2024-12-30 ENCOUNTER — APPOINTMENT (OUTPATIENT)
Dept: CV DIAGNOSTICS | Facility: HOSPITAL | Age: 79
End: 2024-12-30
Attending: INTERNAL MEDICINE
Payer: MEDICARE

## 2024-12-30 ENCOUNTER — APPOINTMENT (OUTPATIENT)
Dept: GENERAL RADIOLOGY | Facility: HOSPITAL | Age: 79
End: 2024-12-30
Attending: EMERGENCY MEDICINE
Payer: MEDICARE

## 2024-12-30 VITALS
SYSTOLIC BLOOD PRESSURE: 140 MMHG | DIASTOLIC BLOOD PRESSURE: 78 MMHG | WEIGHT: 168.81 LBS | OXYGEN SATURATION: 92 % | BODY MASS INDEX: 29.91 KG/M2 | HEART RATE: 73 BPM | TEMPERATURE: 98 F | HEIGHT: 63 IN | RESPIRATION RATE: 21 BRPM

## 2024-12-30 DIAGNOSIS — R07.9 CHEST PAIN, RULE OUT ACUTE MYOCARDIAL INFARCTION: Primary | ICD-10-CM

## 2024-12-30 LAB
ALBUMIN SERPL-MCNC: 4.7 G/DL (ref 3.2–4.8)
ALBUMIN/GLOB SERPL: 1.7 {RATIO} (ref 1–2)
ALP LIVER SERPL-CCNC: 65 U/L
ALT SERPL-CCNC: 36 U/L
ANION GAP SERPL CALC-SCNC: 7 MMOL/L (ref 0–18)
AST SERPL-CCNC: 42 U/L (ref ?–34)
ATRIAL RATE: 83 BPM
BASOPHILS # BLD AUTO: 0.05 X10(3) UL (ref 0–0.2)
BASOPHILS NFR BLD AUTO: 0.7 %
BILIRUB SERPL-MCNC: 0.4 MG/DL (ref 0.2–1.1)
BUN BLD-MCNC: 16 MG/DL (ref 9–23)
CALCIUM BLD-MCNC: 10.5 MG/DL (ref 8.7–10.4)
CHLORIDE SERPL-SCNC: 104 MMOL/L (ref 98–112)
CHOLEST SERPL-MCNC: 218 MG/DL (ref ?–200)
CO2 SERPL-SCNC: 29 MMOL/L (ref 21–32)
CREAT BLD-MCNC: 0.96 MG/DL
D DIMER PPP FEU-MCNC: <0.27 UG/ML FEU (ref ?–0.77)
EGFRCR SERPLBLD CKD-EPI 2021: 61 ML/MIN/1.73M2 (ref 60–?)
EOSINOPHIL # BLD AUTO: 0.09 X10(3) UL (ref 0–0.7)
EOSINOPHIL NFR BLD AUTO: 1.3 %
ERYTHROCYTE [DISTWIDTH] IN BLOOD BY AUTOMATED COUNT: 11.9 %
GLOBULIN PLAS-MCNC: 2.7 G/DL (ref 2–3.5)
GLUCOSE BLD-MCNC: 109 MG/DL (ref 70–99)
HCT VFR BLD AUTO: 41.7 %
HDLC SERPL-MCNC: 81 MG/DL (ref 40–59)
HGB BLD-MCNC: 14.7 G/DL
IMM GRANULOCYTES # BLD AUTO: 0.02 X10(3) UL (ref 0–1)
IMM GRANULOCYTES NFR BLD: 0.3 %
LDLC SERPL CALC-MCNC: 120 MG/DL (ref ?–100)
LYMPHOCYTES # BLD AUTO: 2.68 X10(3) UL (ref 1–4)
LYMPHOCYTES NFR BLD AUTO: 40.2 %
MCH RBC QN AUTO: 32 PG (ref 26–34)
MCHC RBC AUTO-ENTMCNC: 35.3 G/DL (ref 31–37)
MCV RBC AUTO: 90.8 FL
MONOCYTES # BLD AUTO: 0.54 X10(3) UL (ref 0.1–1)
MONOCYTES NFR BLD AUTO: 8.1 %
NEUTROPHILS # BLD AUTO: 3.29 X10 (3) UL (ref 1.5–7.7)
NEUTROPHILS # BLD AUTO: 3.29 X10(3) UL (ref 1.5–7.7)
NEUTROPHILS NFR BLD AUTO: 49.4 %
NONHDLC SERPL-MCNC: 137 MG/DL (ref ?–130)
OSMOLALITY SERPL CALC.SUM OF ELEC: 292 MOSM/KG (ref 275–295)
P AXIS: 65 DEGREES
P-R INTERVAL: 198 MS
PLATELET # BLD AUTO: 234 10(3)UL (ref 150–450)
POTASSIUM SERPL-SCNC: 3.7 MMOL/L (ref 3.5–5.1)
PROT SERPL-MCNC: 7.4 G/DL (ref 5.7–8.2)
Q-T INTERVAL: 378 MS
QRS DURATION: 72 MS
QTC CALCULATION (BEZET): 444 MS
R AXIS: -16 DEGREES
RBC # BLD AUTO: 4.59 X10(6)UL
SODIUM SERPL-SCNC: 140 MMOL/L (ref 136–145)
T AXIS: 62 DEGREES
TRIGL SERPL-MCNC: 95 MG/DL (ref 30–149)
TROPONIN I SERPL HS-MCNC: <3 NG/L
VENTRICULAR RATE: 83 BPM
VLDLC SERPL CALC-MCNC: 17 MG/DL (ref 0–30)
WBC # BLD AUTO: 6.7 X10(3) UL (ref 4–11)

## 2024-12-30 PROCEDURE — 78452 HT MUSCLE IMAGE SPECT MULT: CPT | Performed by: INTERNAL MEDICINE

## 2024-12-30 PROCEDURE — 93018 CV STRESS TEST I&R ONLY: CPT | Performed by: INTERNAL MEDICINE

## 2024-12-30 PROCEDURE — 71045 X-RAY EXAM CHEST 1 VIEW: CPT | Performed by: EMERGENCY MEDICINE

## 2024-12-30 PROCEDURE — 93017 CV STRESS TEST TRACING ONLY: CPT | Performed by: INTERNAL MEDICINE

## 2024-12-30 PROCEDURE — 99223 1ST HOSP IP/OBS HIGH 75: CPT | Performed by: STUDENT IN AN ORGANIZED HEALTH CARE EDUCATION/TRAINING PROGRAM

## 2024-12-30 PROCEDURE — 93306 TTE W/DOPPLER COMPLETE: CPT | Performed by: INTERNAL MEDICINE

## 2024-12-30 RX ORDER — ENOXAPARIN SODIUM 100 MG/ML
40 INJECTION SUBCUTANEOUS DAILY
Status: DISCONTINUED | OUTPATIENT
Start: 2024-12-30 | End: 2024-12-30

## 2024-12-30 RX ORDER — NITROGLYCERIN 0.4 MG/1
0.4 TABLET SUBLINGUAL ONCE
Status: DISCONTINUED | OUTPATIENT
Start: 2024-12-30 | End: 2024-12-30

## 2024-12-30 RX ORDER — UBIDECARENONE 75 MG
100 CAPSULE ORAL DAILY
Status: DISCONTINUED | OUTPATIENT
Start: 2024-12-30 | End: 2024-12-30

## 2024-12-30 RX ORDER — MORPHINE SULFATE 4 MG/ML
2 INJECTION, SOLUTION INTRAMUSCULAR; INTRAVENOUS EVERY 4 HOURS PRN
Status: DISCONTINUED | OUTPATIENT
Start: 2024-12-30 | End: 2024-12-30

## 2024-12-30 RX ORDER — BENZONATATE 200 MG/1
200 CAPSULE ORAL 3 TIMES DAILY PRN
Status: DISCONTINUED | OUTPATIENT
Start: 2024-12-30 | End: 2024-12-30

## 2024-12-30 RX ORDER — ACETAMINOPHEN 500 MG
500 TABLET ORAL EVERY 4 HOURS PRN
Status: DISCONTINUED | OUTPATIENT
Start: 2024-12-30 | End: 2024-12-30

## 2024-12-30 RX ORDER — ECHINACEA PURPUREA EXTRACT 125 MG
1 TABLET ORAL
Status: DISCONTINUED | OUTPATIENT
Start: 2024-12-30 | End: 2024-12-30

## 2024-12-30 RX ORDER — REGADENOSON 0.08 MG/ML
INJECTION, SOLUTION INTRAVENOUS
Status: DISCONTINUED
Start: 2024-12-30 | End: 2024-12-30

## 2024-12-30 RX ORDER — SODIUM PHOSPHATE, DIBASIC AND SODIUM PHOSPHATE, MONOBASIC 7; 19 G/230ML; G/230ML
1 ENEMA RECTAL ONCE AS NEEDED
Status: DISCONTINUED | OUTPATIENT
Start: 2024-12-30 | End: 2024-12-30

## 2024-12-30 RX ORDER — NITROGLYCERIN 0.4 MG/1
0.4 TABLET SUBLINGUAL EVERY 5 MIN PRN
Status: DISCONTINUED | OUTPATIENT
Start: 2024-12-30 | End: 2024-12-30

## 2024-12-30 RX ORDER — BISACODYL 10 MG
10 SUPPOSITORY, RECTAL RECTAL
Status: DISCONTINUED | OUTPATIENT
Start: 2024-12-30 | End: 2024-12-30

## 2024-12-30 RX ORDER — CHOLECALCIFEROL (VITAMIN D3) 50 MCG
2000 TABLET ORAL DAILY
Status: DISCONTINUED | OUTPATIENT
Start: 2024-12-30 | End: 2024-12-30

## 2024-12-30 RX ORDER — SENNOSIDES 8.6 MG
17.2 TABLET ORAL NIGHTLY PRN
Status: DISCONTINUED | OUTPATIENT
Start: 2024-12-30 | End: 2024-12-30

## 2024-12-30 RX ORDER — METOPROLOL SUCCINATE 50 MG/1
50 TABLET, EXTENDED RELEASE ORAL
Status: DISCONTINUED | OUTPATIENT
Start: 2024-12-30 | End: 2024-12-30

## 2024-12-30 RX ORDER — POLYETHYLENE GLYCOL 3350 17 G/17G
17 POWDER, FOR SOLUTION ORAL DAILY PRN
Status: DISCONTINUED | OUTPATIENT
Start: 2024-12-30 | End: 2024-12-30

## 2024-12-30 RX ORDER — ALENDRONATE SODIUM 70 MG/1
70 TABLET ORAL WEEKLY
Status: SHIPPED | COMMUNITY
Start: 2024-12-30

## 2024-12-30 RX ORDER — ASPIRIN 81 MG/1
324 TABLET, CHEWABLE ORAL ONCE
Status: COMPLETED | OUTPATIENT
Start: 2024-12-30 | End: 2024-12-30

## 2024-12-30 RX ORDER — ONDANSETRON 2 MG/ML
4 INJECTION INTRAMUSCULAR; INTRAVENOUS EVERY 6 HOURS PRN
Status: DISCONTINUED | OUTPATIENT
Start: 2024-12-30 | End: 2024-12-30

## 2024-12-30 RX ORDER — METOCLOPRAMIDE HYDROCHLORIDE 5 MG/ML
5 INJECTION INTRAMUSCULAR; INTRAVENOUS EVERY 8 HOURS PRN
Status: DISCONTINUED | OUTPATIENT
Start: 2024-12-30 | End: 2024-12-30

## 2024-12-30 NOTE — H&P
[facility]     Kaylee Byrd Patient Status:  Emergency    11/3/1945 MRN [id number]   Location [facility] EMERGENCY DEPARTMENT Attending Baltazar Ritchie MD   Hosp Day # 0 PCP Andressa Martinez MD     Chief Complaint: chest pain    Subjective:    History of Present Illness:     Kaylee Byrd is a 79 year old female with PMHx HLD/ palpitations who presented to the hospital for chest pain. She was trying to lay down and sleep when she felt \"a charley horse in my chest\" over her left anterior chest which was rated 10/10. She tried massaging it without any relief and it lasted 2 minutes before resolving on its own. She had a similar episode 5 days ago but originally did not think much of it. She denied any associated dyspnea, dizziness, diaphoresis, nausea, palpitations. Her last stress test was about 30 years ago which was normal    History/Other:    Past Medical History:  Past Medical History:    Abdominal pain    Acute pharyngitis    viral    Boil    small vaginal boil    Breast mass    nodular mass in breasts s/p implants; -nodular density (simple cyst)    Cervical lesion    Chest tightness    Chest wall pain    Chickenpox    Cough    Diverticulitis    -sigmoid diverticulitis    Diverticulosis    and hyperplastic polyp    Diverticulosis    and hyperplastic polyp     DUB (dysfunctional uterine bleeding)    DUB/anovulatory bleeding    H/O breast augmentation    Heart palpitations    Hematuria    High cholesterol    hx high cholesterol/triglycerides    History of bone density study    Hot flashes    Hyperlipidemia    Irregular periods    Knee pain    Laboratory examination ordered as part of a routine general medical examination    Laceration of finger, right    Measles    Medial meniscus tear    Mole (skin)    moles on back-changing    Mumps    Muscle strain    Osteoarthrosis, localized, primary, knee    Osteopenia    Pleurisy    Rib pain    RLQ abdominal pain     Sebaceous cyst    vulvar    Sedentary lifestyle    URI (upper respiratory infection)    Wheezing    Work-related stress     Past Surgical History:   Past Surgical History:   Procedure Laterality Date    Breast surgery procedure unlisted      breast augmentation/saline    Colonoscopy  , , 2009    diverticulosis    Colonoscopy,biopsy  09    Performed by INOCENCIO MELÉNDEZ at [facility]    Colonoscopy,diagnostic  2012    Procedure: COLONOSCOPY, POSSIBLE BIOPSY, POSSIBLE POLYPECTOMY 67435;  Surgeon: Inocencio Meléndez MD;  Location: [facility]    Knee replacement surgery  10/17/12    R TKA          Other surgical history Left     shoulder surgery    Other surgical history      mole removal x2 on back    Other surgical history  12/10/12    left knee manipulation     Skin surgery  2019    Tubal ligation  1989    Laparoscopy with B/L tubal fulguration and cutting      Family History:   Family History   Problem Relation Age of Onset    Cancer Mother         colon cancer at 81    Cancer Sister         colon cancer    Cancer Other         colon ca, mat uncle    Other (Allergies/Asthma) Brother     Hypertension Son     Dementia Sister         diagnosed  -  years old     Social History:    reports that she has never smoked. She has never used smokeless tobacco. She reports current alcohol use of about 1.0 standard drink of alcohol per week. She reports that she does not use drugs.     Allergies: Allergies[1]    Medications:  Medications Ordered Prior to Encounter[2]    Review of Systems:   A comprehensive review of systems was completed.    Pertinent positives and negatives noted in the HPI.    Objective:   Physical Exam:    /77   Pulse 75   Temp 98.2 °F (36.8 °C) (Temporal)   Resp 15   Ht 5' 3\" (1.6 m)   Wt 160 lb (72.6 kg)   SpO2 97%   BMI 28.34 kg/m²   General: No acute distress, Alert  Respiratory: No rhonchi, no wheezes  Cardiovascular: S1,  S2. Regular rate and rhythm  Abdomen: Soft, Non-tender, non-distended, positive bowel sounds  Neuro: No new focal deficits  Extremities: No edema    Results:    Labs:      Labs Last 24 Hours:    Recent Labs   Lab 12/30/24  0026   RBC 4.59   HGB 14.7   HCT 41.7   MCV 90.8   MCH 32.0   MCHC 35.3   RDW 11.9   NEPRELIM 3.29   WBC 6.7   .0       Recent Labs   Lab 12/30/24  0026   *   BUN 16   CREATSERUM 0.96   EGFRCR 61   CA 10.5*   ALB 4.7      K 3.7      CO2 29.0   ALKPHO 65   AST 42*   ALT 36   BILT 0.4   TP 7.4       Lab Results   Component Value Date    PT 27.1 (H) 11/20/2012    PT 27.4 (H) 11/13/2012    PT 24.4 (H) 10/20/2012    INR 2.61 (H) 11/20/2012    INR 2.65 (H) 11/13/2012    INR 2.28 (H) 10/20/2012       Recent Labs   Lab 12/30/24  0026   TROPHS <3       No results for input(s): \"TROP\", \"PBNP\" in the last 168 hours.    No results for input(s): \"PCT\" in the last 168 hours.    Imaging: Imaging data reviewed in Epic.    Assessment & Plan:      #Chest pain  -troponin <3, d-dimer WNL  -EKG showing NSR @83 bpm, ST elevation aVR without reciprocal depressions, T wvae inversions aVR/V1  -chest xray without acute process  -r/o ACS  -cont to trend troponin until peak  -monitor on telemetry  -check AM lipid panel  -nitroglycerin, morphine prn  -cardiology c/s    #palpitations  -cont home metoprolol        Plan of care discussed with ED physician    Brandi Becerra DO    Supplementary Documentation:     The 21st Century Cures Act makes medical notes like these available to patients in the interest of transparency. Please be advised this is a medical document. Medical documents are intended to carry relevant information, facts as evident, and the clinical opinion of the practitioner. The medical note is intended as peer to peer communication and may appear blunt or direct. It is written in medical language and may contain abbreviations or verbiage that are unfamiliar.                                        [1]   Allergies  Allergen Reactions    Vicodin [Hydrocodone-Acetaminophen]      CONSTIPATION.    [2]   No current facility-administered medications on file prior to encounter.     Current Outpatient Medications on File Prior to Encounter   Medication Sig Dispense Refill    alendronate 70 MG Oral Tab Take 1 tablet (70 mg total) by mouth once a week. 12 tablet 3    metoprolol succinate ER 50 MG Oral Tablet 24 Hr Take 1 tablet (50 mg total) by mouth daily.      cholecalciferol 50 MCG (2000 UT) Oral Cap Take 3,000 Units by mouth daily.      Ascorbic Acid (VITAMIN C OR) Take 1 tablet by mouth daily.      Calcium Carbonate-Vitamin D (CALCIUM + D OR) Take 1 tablet by mouth daily.         Coenzyme Q10 (CO Q 10 OR) Take 1 tablet by mouth daily.      Multiple Vitamin (MULTIVITAMINS) Oral Tab 1 TABLET DAILY      VITAMIN E 1 tablet daily.      Cyanocobalamin (VITAMIN B 12 OR) Take 1 tablet by mouth daily.

## 2024-12-30 NOTE — PLAN OF CARE
Patient A&O x4, room air, currently no c/o pain.  Patient has PMH of palpitations.  Started having palpitations over the last couple days where it feels like she has a jyotsna horse in her chest.  It started again last night and she came to ER.

## 2024-12-30 NOTE — CONSULTS
[facility]  Cardiology Consultation    Kaylee Byrd Patient Status:  Observation    11/3/1945 MRN [id number]   Location [facility] 0SW-A Attending Brandi Becerra, DO   Hosp Day # 0 PCP Andressa Martinez MD     Reason for Consultation:  Chest pain    History of Present Illness:  Kaylee Byrd is a a(n) 79 year old female with hx of HLP and palpitations - the latter controlled with metoprolol who presents with two brief episodes of chest pain that woke her up from sleep.  First on Thursday after a [place] and then again last night around 11:30 after a meal out at 6:30 pm.  Describes pain under the left breast with no associated SOB, orthopnea, PND, near-syncope or syncope.  Feels like a cramp - sharp pain.  No pleuritic component to it.  Self limited for ~2 min.  Last night decided to drive herself to ED.    Since ED visit her cardiac markers have remained negative and ECG is normal.  Echo ordered but not performed yet.    Currently CP free.    History:  Past Medical History:    Abdominal pain    Acute pharyngitis    viral    Boil    small vaginal boil    Breast mass    nodular mass in breasts s/p implants; -nodular density (simple cyst)    Cervical lesion    Chest tightness    Chest wall pain    Chickenpox    Cough    Diverticulitis    -sigmoid diverticulitis    Diverticulosis    and hyperplastic polyp    Diverticulosis    and hyperplastic polyp     DUB (dysfunctional uterine bleeding)    DUB/anovulatory bleeding    H/O breast augmentation    Heart palpitations    Hematuria    High cholesterol    hx high cholesterol/triglycerides    History of bone density study    Hot flashes    Hyperlipidemia    Irregular periods    Knee pain    Laboratory examination ordered as part of a routine general medical examination    Laceration of finger, right    Measles    Medial meniscus tear    Mole (skin)    moles on back-changing    Mumps    Muscle strain    Osteoarthrosis, localized, primary, knee     Osteopenia    Pleurisy    Rib pain    RLQ abdominal pain    Sebaceous cyst    vulvar    Sedentary lifestyle    URI (upper respiratory infection)    Wheezing    Work-related stress     Past Surgical History:   Procedure Laterality Date    Breast surgery procedure unlisted      breast augmentation/saline    Colonoscopy  , , 2009    diverticulosis    Colonoscopy,biopsy  09    Performed by INOCENCIO MELÉNDEZ at [facility]    Colonoscopy,diagnostic  2012    Procedure: COLONOSCOPY, POSSIBLE BIOPSY, POSSIBLE POLYPECTOMY 36826;  Surgeon: Inocencio Meléndez MD;  Location: [facility]    Knee replacement surgery  10/17/12    R TKA          Other surgical history Left     shoulder surgery    Other surgical history      mole removal x2 on back    Other surgical history  12/10/12    left knee manipulation     Skin surgery  2019    Tubal ligation  1989    Laparoscopy with B/L tubal fulguration and cutting     Family History   Problem Relation Age of Onset    Cancer Mother         colon cancer at 81    Cancer Sister         colon cancer    Cancer Other         colon ca, mat uncle    Other (Allergies/Asthma) Brother     Hypertension Son     Dementia Sister         diagnosed  -  years old      reports that she has never smoked. She has never used smokeless tobacco. She reports current alcohol use of about 1.0 standard drink of alcohol per week. She reports that she does not use drugs.    Allergies:  Allergies[1]    Medications:    Current Facility-Administered Medications:     nitroglycerin (Nitrostat) SL tab 0.4 mg, 0.4 mg, Sublingual, Once    nitroglycerin (Nitrostat) SL tab 0.4 mg, 0.4 mg, Sublingual, Q5 Min PRN    morphINE PF 4 MG/ML injection 2 mg, 2 mg, Intravenous, Q4H PRN    enoxaparin (Lovenox) 40 MG/0.4ML SUBQ injection 40 mg, 40 mg, Subcutaneous, Daily    acetaminophen (Tylenol Extra Strength) tab 500 mg, 500 mg, Oral, Q4H PRN    melatonin tab 3 mg, 3  mg, Oral, Nightly PRN    polyethylene glycol (PEG 3350) (Miralax) 17 g oral packet 17 g, 17 g, Oral, Daily PRN    sennosides (Senokot) tab 17.2 mg, 17.2 mg, Oral, Nightly PRN    bisacodyl (Dulcolax) 10 MG rectal suppository 10 mg, 10 mg, Rectal, Daily PRN    fleet enema (Fleet) rectal enema 133 mL, 1 enema, Rectal, Once PRN    ondansetron (Zofran) 4 MG/2ML injection 4 mg, 4 mg, Intravenous, Q6H PRN    metoclopramide (Reglan) 5 mg/mL injection 5 mg, 5 mg, Intravenous, Q8H PRN    benzonatate (Tessalon) cap 200 mg, 200 mg, Oral, TID PRN    glycerin-hypromellose- (Artificial Tears) 0.2-0.2-1 % ophthalmic solution 1 drop, 1 drop, Both Eyes, QID PRN    sodium chloride (Saline Mist) 0.65 % nasal solution 1 spray, 1 spray, Each Nare, Q3H PRN    cholecalciferol (Vitamin D3) tab 2,000 Units, 2,000 Units, Oral, Daily    cyanocobalamin (Vitamin B12) tab 100 mcg, 100 mcg, Oral, Daily    metoprolol succinate ER (Toprol XL) 24 hr tab 50 mg, 50 mg, Oral, Daily Beta Blocker    Review of Systems:  A comprehensive review of systems was negative if not otherwise mention in above HPI.    /76 (BP Location: Left arm)   Pulse 92   Temp 97.9 °F (36.6 °C) (Oral)   Resp 26   Ht 63\"   Wt 168 lb 12.8 oz   SpO2 93%   BMI 29.90 kg/m²   Temp (24hrs), Av °F (36.7 °C), Min:97.9 °F (36.6 °C), Max:98.2 °F (36.8 °C)       Intake/Output Summary (Last 24 hours) at 2024 1001  Last data filed at 2024 0900  Gross per 24 hour   Intake 240 ml   Output --   Net 240 ml     Wt Readings from Last 3 Encounters:   24 168 lb 12.8 oz   09/09/24 163 lb 6.4 oz   23 161 lb 6.4 oz       Physical Exam:   General: Alert and oriented x 3. No apparent distress. No respiratory or constitutional distress.  HEENT: Normocephalic   Neck: No JVD,   Cardiac: Regular rate and rhythm. S1, S2 normal. No murmur   Lungs: Clear anteriorly  Extremities: Without clubbing, cyanosis or edema.    Neurologic: Alert and oriented, normal  affect.  Skin: Warm and dry.     Laboratory Data:  Lab Results   Component Value Date    WBC 6.7 12/30/2024    HGB 14.7 12/30/2024    HCT 41.7 12/30/2024    .0 12/30/2024    CREATSERUM 0.96 12/30/2024    BUN 16 12/30/2024     12/30/2024    K 3.7 12/30/2024     12/30/2024    CO2 29.0 12/30/2024     12/30/2024    CA 10.5 12/30/2024    ALB 4.7 12/30/2024    ALKPHO 65 12/30/2024    BILT 0.4 12/30/2024    TP 7.4 12/30/2024    AST 42 12/30/2024    ALT 36 12/30/2024    DDIMER <0.27 12/30/2024       Imaging:  ECG shows normal SR, normal ECG    Impression/Recs:  Principal Problem:    Chest pain, rule out acute myocardial infarction  Two episodes of atypical chest pain with ACS ruled out with enzymes and ECG ovenight.  Await echo results, if unremarkable will proceed with Lexiscan nuc for risk stratification.  Continue BB for palpitations.    Thank you for allowing me to participate in the care of your patient.    Panda Ramirez MD  12/30/2024  10:01 AM         [1]   Allergies  Allergen Reactions    Vicodin [Hydrocodone-Acetaminophen]      CONSTIPATION.

## 2024-12-30 NOTE — ED PROVIDER NOTES
Patient Seen in: [facility] Emergency Department      History     Chief Complaint   Patient presents with    Chest Pain Angina     Stated Complaint: chest pain, resolved    Subjective:   HPI      79-year-old female presenting to the emergency room for chest pain.  Patient states over 1130 she started some left-sided chest pain that became Bexxar going on the left arm she had another episode Thursday earlier in the week she denies recent illness cough cold runny nose or any other exacerbating factors or associated symptoms.    Objective:     Past Medical History:    Abdominal pain    Acute pharyngitis    viral    Boil    small vaginal boil    Breast mass    nodular mass in breasts s/p implants; 2000-nodular density (simple cyst)    Cervical lesion    Chest tightness    Chest wall pain    Chickenpox    Cough    Diverticulitis    4/02-sigmoid diverticulitis    Diverticulosis    and hyperplastic polyp    Diverticulosis    and hyperplastic polyp     DUB (dysfunctional uterine bleeding)    DUB/anovulatory bleeding    H/O breast augmentation    Heart palpitations    Hematuria    High cholesterol    hx high cholesterol/triglycerides    History of bone density study    Hot flashes    Hyperlipidemia    Irregular periods    Knee pain    Laboratory examination ordered as part of a routine general medical examination    Laceration of finger, right    Measles    Medial meniscus tear    Mole (skin)    moles on back-changing    Mumps    Muscle strain    Osteoarthrosis, localized, primary, knee    Osteopenia    Pleurisy    Rib pain    RLQ abdominal pain    Sebaceous cyst    vulvar    Sedentary lifestyle    URI (upper respiratory infection)    Wheezing    Work-related stress              Past Surgical History:   Procedure Laterality Date    Breast surgery procedure unlisted  1982    breast augmentation/saline    Colonoscopy  2001, 2006, 6/2009    diverticulosis    Colonoscopy,biopsy  6/12/09    Performed by SARAH GAITAN [place]  [facility] [place] [facility]    Colonoscopy,diagnostic  2012    Procedure: COLONOSCOPY, POSSIBLE BIOPSY, POSSIBLE POLYPECTOMY 75999;  Surgeon: Inocencio Meléndez MD;  Location: [facility]    Knee replacement surgery  10/17/12    R TKA          Other surgical history Left     shoulder surgery    Other surgical history  2000    mole removal x2 on back    Other surgical history  12/10/12    left knee manipulation     Skin surgery  2019    Tubal ligation  1989    Laparoscopy with B/L tubal fulguration and cutting                Social History     Socioeconomic History    Marital status:    Tobacco Use    Smoking status: Never    Smokeless tobacco: Never   Vaping Use    Vaping status: Never Used   Substance and Sexual Activity    Alcohol use: Yes     Alcohol/week: 1.0 standard drink of alcohol     Types: 1 Glasses of wine per week     Comment: 3 drinks/month    Drug use: No   Other Topics Concern    Caffeine Concern No    Stress Concern No    Weight Concern Yes    Special Diet No    Exercise Yes    Seat Belt No                  Physical Exam     ED Triage Vitals [24 0017]   BP (!) 165/92   Pulse 92   Resp 16   Temp 98.2 °F (36.8 °C)   Temp src Temporal   SpO2 97 %   O2 Device None (Room air)       Current Vitals:   Vital Signs  BP: 142/77  Pulse: 75  Resp: 15  Temp: 98.2 °F (36.8 °C)  Temp src: Temporal  MAP (mmHg): 96    Oxygen Therapy  SpO2: 97 %  O2 Device: None (Room air)        Physical Exam  Awake alert patient appears no distress HEENT exam is normal lungs are clear cardiovascular exam regular rhythm abdomen soft nontender EXTR no Cyanosis or edema no rash    ED Course     Labs Reviewed   COMP METABOLIC PANEL (14) - Abnormal; Notable for the following components:       Result Value    Glucose 109 (*)     Calcium, Total 10.5 (*)     AST 42 (*)     All other components within normal limits   TROPONIN I HIGH SENSITIVITY - Normal   D-DIMER - Normal   CBC WITH DIFFERENTIAL WITH  PLATELET   RAINBOW DRAW BLUE     EKG    Rate, intervals and axes as noted on EKG Report.  Rate: 83  Rhythm: Sinus Rhythm  Reading: No areas of acute ST segment elevation or depression                Differential diagnosis includes acute coronary syndrome, pulmonary embolism       MDM          Admission disposition: 12/30/2024  1:59 AM           Medical Decision Making  79-year-old female presenting to the emergency department for chest pain IV established cardiac monitor shows a sinus rhythm pulse ox shows no signs of hypoxia.  CBC shows normal white cell count metabolic panel stable renal function troponin shows no elevation negative NSTEMI and the patient's D-dimer shows no elevation to get a pulm embolism independent interpretation by ED physician of chest x-ray shows no pneumothorax patient is pain-free at this time patient will be admitted for further cardiac monitoring and evaluation.    Problems Addressed:  Chest pain, rule out acute myocardial infarction: acute illness or injury    Amount and/or Complexity of Data Reviewed  Labs: ordered. Decision-making details documented in ED Course.  Radiology: ordered and independent interpretation performed. Decision-making details documented in ED Course.  ECG/medicine tests: ordered and independent interpretation performed. Decision-making details documented in ED Course.        Disposition and Plan     Clinical Impression:  1. Chest pain, rule out acute myocardial infarction         Disposition:  Admit  12/30/2024  1:59 am    Follow-up:  No follow-up provider specified.        Medications Prescribed:  Current Discharge Medication List              Supplementary Documentation:         Hospital Problems       Present on Admission  Date Reviewed: 10/2/2024            ICD-10-CM Noted POA    * (Principal) Chest pain, rule out acute myocardial infarction R07.9 12/30/2024 Unknown

## 2024-12-30 NOTE — ED QUICK NOTES
Orders for admission, patient is aware of plan and ready to go upstairs. Any questions, please call ED RN Michelle at extension [phone].     Patient Covid vaccination status: Fully vaccinated     COVID Test Ordered in ED: None    COVID Suspicion at Admission: N/A    Running Infusions:  None    Mental Status/LOC at time of transport: a/ox4    Other pertinent information:   CIWA score: N/A   NIH score:  N/A

## 2024-12-30 NOTE — HISTORICAL OFFICE NOTE
[facility]  [address] [place] [address], 4th floor, [place] [address]  [phone]     Kaylee Byrd         Progress Note         Demographics:   Name: Kaylee Byrd YOB: 1945   Age: 78, Female Medical Record No: [id number]   Visited Date/Time: 02/06/2024 11:20 AM           Chief Complaints   6 month follow up      History of Present Illness   Kaylee's blood pressures are well-controlled.  Her Toprol was increased to 50 which seems to control his palpitations very well.  She occasionally has after meals when she lays down episodes when she feels minimal palpitations.  Her blood pressure was on the higher side.  She did buy a blood pressure cuff and at home her blood pressure readings are much better.  There seems to be a component of whitecoat hypertension.  Denies chest pain.      Cardiac risk factors Never smoked      Past Medical History   1.PVCs (premature ventricular contractions)   2.Palpitations   3.Chest tightness   4.Hyperlipidemia, mixed   5.Unspecified abdominal pain   6.Diverticulosis   7.Age-related osteoporosis without current pathological fracture      Past Surgical History   1.History of left knee replacement      Family History   1. Mother Age 81 - Cancer (Reason for death)   2. Sister - Cancer (Reason for death)      Social History      Smoking status Never smoked  Tobacco usage - No (Non-smoker for personal reasons (finding))         Review of systems   Cardiovascular No history of Chest pain, SOUSA, Palpitations, Syncope, PND, Orthopnea, Edema and Claudication      Physical Examination   Vitals Right Arm Sitting  / 70 mmHg, Pulse rate 88 bpm, Height in 5' 3\", BMI: 28.2, Weight in 159 lbs (or) 72.12 kgs and BSA : 1.81 cc/m²   General Appearance No Acute Distress   Cardiovascular s1, s2 reg, no murmur and   Lower Extremities no edema      EKG/Other abnormalities   HEENT:  EOMI, PERRL  Neck: trachea midline  Lungs: CTAB  Skin: warm and dry  Neuro: moving  extremities appropriately      Allergies   1.HYDROcodone-acetaminophen - Drug Class(Reaction:Constipation, Severity:Unknown)      Medications   1.alendronate 70 mg tablet, Take 1 tablet orally once a week   2.Calcium Magnesium 500 mg calcium-250 mg tablet, Take 1 tablet orally once a day.   3.cholecalciferol (vitamin D3) 50 mcg (2,000 unit) tablet, Take 1 tablet orally once a day.   4.coenzyme Q10 10 mg capsule, Take 1 capsule orally once a day.   5.Multiple Vitamins tablet, Take 1 tablet orally once a day.   6.Toprol XL 50 mg tablet,extended release, Take 1 tablet orally once a day.   7.Vitamin E      Impression   1.PVCs (premature ventricular contractions)   2.Palpitations   3.Hyperlipidemia, mixed      Assessment & Plan   Compensated cardiac status.  Continue Toprol 50 mg daily can use an extra half a pill if needed if the palpitations become more frequent.  No additional cardiac needed at present.  No need to repeat a Holter.  Follow-up with me in a year.  We will refill her Toprol prescription for 90-day supply with 3 refills.      Medications Ordered   1.Toprol XL 50 mg tablet,extended release, Take 1 tablet orally once a day.      Future appointments   1.Follow up visit - Panda Ramirez MD (1 Year)      Miscellaneous   1.Weight monitoring (regime/therapy)      Nurses documentation   Refills : None   Upcoming surgeries: None   Assistive devices: None   Verbal EKG order: None     (JEANNETTE , MA )       Patient instructions   Your provider has requested for you to follow up in 1 year. We will call you to schedule the appointment. It is always important to bring all your medications including over the counter medications, vitamins and supplements to your doctor visits. This will assist in providing the best care for your condition. Please bring your insurance cards to your appointment.       Diagnostics Details      Trans Thoracic Echocardiogram 07/05/2023   1.The study quality is average.    2.The left ventricle is  normal in size. Global left ventricular systolic function is normal. The left ventricular ejection fraction is 66%. The left ventricle diastolic function is impaired (Grade I) with normal left atrial pressure. Wall thickness appears normal. No wall motion abnormality noted.    3.The right ventricle is normal in size. Right ventricular systolic function is normal.    4.Mild (1+) tricuspid regurgitation.    5.The estimated pulmonary artery systolic pressure is 30 mmHg assuming a right atrial pressure of 3 mmHg.      Holter Monitoring 07/05/2023   1.This is a good quality study.    2.Predominant rhythm is normal sinus rhythm.    3.The minimum heart rate recorded was 53 beats / minute (7/7/2023). The maximum heart rate is 142 beats / minute (7/6/2023). The mean heart rate is 80 beats / minute.    4.A FIB BURDEN         CPOE Orders carried out by: Panda Ramirez MD and Sylvia Prieto      Care Providers: Panda Ramirez MD, Sylvia Prieto and Arash Sanchez CCMA         Electronically Authenticated by   Panda Ramirez MD   02/06/2024 04:06:57 PM      Disclaimer: Components of this note were documented using voice recognition system and are subject to errors not corrected at proofreading. Contact the author of this note for any clarifications.

## 2024-12-30 NOTE — PLAN OF CARE
Prelim nuc stress negative, no reversible ischemia. EF 79%. No mention of chest pain during testing.

## 2024-12-30 NOTE — PLAN OF CARE
Resumed care at 0730. Aox4, vitals stable. Denies pain or chest pain. Independent, ambulatory in room. Tolerating PO diet. Echo, stress test neg. Ok to discharge per cardiology and hospitalist. Discharge instructions given and explained to patient, verbalized understand. IV and tele removed. Discharged to [facility].

## 2024-12-31 ENCOUNTER — PATIENT OUTREACH (OUTPATIENT)
Dept: CASE MANAGEMENT | Age: 79
End: 2024-12-31

## 2024-12-31 NOTE — PROGRESS NOTES
Transitions of Care Navigation  Discharge Date: 24  Contact Date: 2024    Transitions of Care Assessment:  JOSE Initial Assessment    General:  Assessment completed with: Patient  Patient Subjective: NCM spoke with patient states had a good night sleep last night. Patient denies any chest pain, shortness of breath, fevers, chills, nausea, vomiting, feeling dizzy or lightheaded or any others at this time. Pt states has an appointment with Dr. Ramirez on 1/3/25, she declined PCP appointment at this time. Pt denies any further questions or concerns at this time.  Chief Complaint: Chest pain, rule out acute myocardial infarction  Verify patient name and  with patient/ caregiver: Yes    Hospital Stay/Discharge:  Tell me what you understand of why you were in the hospital or emergency department: Pt presented to ED with chest pain.  Prior to leaving the hospital were your Discharge Instructions reviewed with you?: Yes  Did you receive a copy of your written Discharge Instructions?: Yes  What questions do you have about your Discharge Instructions?: none  Do you feel better or worse since you left the hospital or emergency department?: Better    Follow - Up Appointment:  Do you have a follow-up appointment?: Yes  Date: 25  Physician: Dr. Ramirez  Are there any barriers to getting to your follow-up appointment?: No    Home Health/DME:  Prior to leaving the hospital was Home Health (HH) arranged for you?: N/A  Are HH needs identified by staff during the assessment?: No     Prior to leaving the hospital or emergency department was Durable Medical Equipment (DME), medical supplies, or infusions arranged for you?: N/A  Are DME/medical supply/infusions needs identified by staff during this assessment?: No     Medications/Diet:  Did any of your medications change, during or after your hospital stay or ED visit?: No  Do you understand what your medications are for and possible side effects?: Yes  Are there  any reasons that keep you from taking your medication as prescribed?: No  Any concerns about medication refills?: No    Were you given a different diet per your Discharge Instructions?: No  Reason: n/a     Questions/Concerns:  Do you have any questions or concerns that have not been discussed?: No   JOSE Follow-up Assessment    General:  Assessment completed with: Patient  Patient Subjective: NCM spoke with patient states had a good night sleep last night. Patient denies any chest pain, shortness of breath, fevers, chills, nausea, vomiting, feeling dizzy or lightheaded or any others at this time. Pt states has an appointment with Dr. Ramirez on 1/3/25, she declined PCP appointment at this time. Pt denies any further questions or concerns at this time.  Chief Complaint: Chest pain, rule out acute myocardial infarction  Community Resources: Other (none)    Nursing Interventions:  All discharge instructions reviewed with the patient. Reviewed when to call MD vs when to call 911 or go the ED. Educated patient on the importance of taking all meds as prescribed as well as close f/u with PCP/specialists. Pt verbalized understanding and will contact the office with any further questions or concerns. Patient denies fevers, chills, nausea, vomiting, shortness of breath, chest pain, or any other symptoms at this time.  [facility] attempted to schedule HFU, patient declined will follow up with cardiology on 1/3/25. [facility] provided contact information for any further questions/concerns. Patient verbalized understanding and agreeable.         Medications:Reviewed medication list.  Medications are up to date.  Current Outpatient Medications   Medication Sig Dispense Refill    alendronate 70 MG Oral Tab Take 1 tablet (70 mg total) by mouth once a week. Sunday 0600      metoprolol succinate ER 50 MG Oral Tablet 24 Hr Take 1 tablet (50 mg total) by mouth daily.      cholecalciferol 50 MCG (2000 UT) Oral Cap Take 3,000 Units by mouth daily.       Ascorbic Acid (VITAMIN C OR) Take 1 tablet by mouth daily.      Calcium Carbonate-Vitamin D (CALCIUM + D OR) Take 1 tablet by mouth daily.         Coenzyme Q10 (CO Q 10 OR) Take 1 tablet by mouth daily.      Multiple Vitamin (MULTIVITAMINS) Oral Tab 1 TABLET DAILY      VITAMIN E 1 tablet daily.      Cyanocobalamin (VITAMIN B 12 OR) Take 1 tablet by mouth daily.           Follow-up Appointments:  Your appointments       Date & Time Appointment Department ([place])    Apr 09, 2025 9:30 AM CDT Established Patient with Davi Moura MD [facility] ([facility])              [facility]  [facility]  [address] , [address] [address]  [phone]            [facility]  Was TCC Ordered: No      Primary Care Provider (If no TCC appointment)  Does patient already have a PCP appointment scheduled? No  Nurse Care Manager Attempted to schedule PCP office ER Follow-up appointment with patient   -If no appointment scheduled: Explain : pt declined will follow up with cardiology.     Specialist  Does the patient have any other follow-up appointment(s) need to be scheduled? No         Book By Date: 1/6/25

## 2025-04-17 ENCOUNTER — OFFICE VISIT (OUTPATIENT)
Facility: LOCATION | Age: 80
End: 2025-04-17
Payer: MEDICARE

## 2025-04-17 VITALS — HEIGHT: 63 IN | BODY MASS INDEX: 29.12 KG/M2 | WEIGHT: 164.38 LBS

## 2025-04-17 DIAGNOSIS — H61.22 IMPACTED CERUMEN OF LEFT EAR: Primary | ICD-10-CM

## 2025-04-17 DIAGNOSIS — R04.0 ANTERIOR EPISTAXIS: ICD-10-CM

## 2025-04-17 PROCEDURE — 99203 OFFICE O/P NEW LOW 30 MIN: CPT | Performed by: OTOLARYNGOLOGY

## 2025-04-17 PROCEDURE — 69210 REMOVE IMPACTED EAR WAX UNI: CPT | Performed by: OTOLARYNGOLOGY

## 2025-04-17 PROCEDURE — 3008F BODY MASS INDEX DOCD: CPT | Performed by: OTOLARYNGOLOGY

## 2025-04-17 PROCEDURE — 1160F RVW MEDS BY RX/DR IN RCRD: CPT | Performed by: OTOLARYNGOLOGY

## 2025-04-17 PROCEDURE — 1159F MED LIST DOCD IN RCRD: CPT | Performed by: OTOLARYNGOLOGY

## 2025-04-17 RX ORDER — MOXIFLOXACIN 5 MG/ML
SOLUTION/ DROPS OPHTHALMIC
COMMUNITY
Start: 2025-04-08

## 2025-04-17 RX ORDER — PREDNISOLONE ACETATE 10 MG/ML
SUSPENSION/ DROPS OPHTHALMIC
COMMUNITY
Start: 2025-04-08

## 2025-04-17 NOTE — PROGRESS NOTES
NEW PATIENT PROGRESS NOTE  OTOLOGY/OTOLARYNGOLOGY    REF MD:  No referring provider defined for this encounter.    PCP: Andressa Martinez MD    CHIEF COMPLAINT:    Chief Complaint   Patient presents with    New Patient    Nose Problem     Patient here for left nostril bleeding.     Ear Problem     Patient here for clogged right ear.     History of Present Illness  Kaylee Byrd is a 79 year old female who presents with recurrent nosebleeds and ear clogging.    She has been experiencing recurrent epistaxis since a visit to [place], where the dry air seemed to exacerbate the condition. The epistaxis temporarily stops and scabs over but then recurs. She has been applying Vaseline to the area, which she believes may have been helpful. The epistaxis has persisted since February and worsened after catching a cold in early April, which required frequent nose blowing.    She also experiences ear clogging, particularly in the left ear, which was noted both before and during her trip to [place]. She describes the sensation as similar to wearing an ear plug, with temporary relief achieved by manipulating her ear. She recalls a similar issue two years ago, which was resolved by ear wax removal during an annual checkup. She suspects ear wax buildup as the cause of her current symptoms.    She mentions a history of a blocked eustachian tube after eating Cheetos, which caused facial swelling and required emergency room treatment. She was advised to suck on lemon candy to alleviate the blockage.    No ringing in the ears or dizziness, except for slight ringing in the left ear when clenching her jaw. She also notes a history of elevated blood pressure, which she attributes to stress, but does not provide specific measurements.        PAST MEDICAL HISTORY:  Past Medical History[1]    PAST SURGICAL HISTORY:  Past Surgical History[2]    Medications Ordered Prior to Encounter[3]    Allergies: Allergies[4]    SOCIAL HISTORY:    Social  History     Tobacco Use    Smoking status: Never    Smokeless tobacco: Never   Substance Use Topics    Alcohol use: Yes     Alcohol/week: 1.0 standard drink of alcohol     Types: 1 Glasses of wine per week     Comment: 3 drinks/month       Family History[5]    REVIEW OF SYSTEMS:   PER HPI    EXAMINATION:  I washed my hands with an alcohol-based hand gel prior to examination  Constitutional:   --Vitals: Height 5' 3\" (1.6 m), weight 164 lb 6.4 oz (74.6 kg), not currently breastfeeding.  General: no apparent distress, well-developed  Neuro: Cranial nerves: EOMI, Facial sensation intact to touch, palate elevates midline, tongue protrudes midline, shoulder shrug intact bilateral, facial movement normal bilateral  Respiratory: No stridor, stertor or increased work of breathing  ENT:  --Nose: no external nasal deformity, anterior rhinoscopy: Septum midline with nearly resolved scabbing on the left anterior septum, no inferior turbinate hypertrophy, mucosa healthy, no rhinorrhea  --Neck: No palpable cervical lymphadenopathy, no thyromegaly, no masses or lesions over the bilateral submandibular or parotid glands  --Ear: (bilateral ears were examined under binocular microscopy)  Right ear microscopic exam:  Pinna: Normal, no lesions or masses.  Mastoid: Nontender on palpation.   External auditory canal: Clear, no masses or lesions. Cerumen impaction removed.  Tympanic membrane: Intact, no lesions, normal landmarks.  Middle ear: Aerated.    Left ear microscopic exam:  Pinna: Normal, no lesions or masses.  Mastoid: Nontender on palpation.   External auditory canal: Clear, no masses or lesions.  Tympanic membrane: Intact, no lesions, normal landmarks.  Middle ear: Aerated.    Cerumen removal: (04/17/25)  Under binocular microscopy cerumen was removed from the left external auditory canal using a wax loop curette. Patient noted subjective improvement in hearing.      ASSESSMENT/PLAN:  Kaylee Byrd is a 79 year old female  with     ICD-10-CM   1. Impacted cerumen of left ear  H61.22   2. Anterior epistaxis  R04.0      Assessment & Plan  Epistaxis  Recurrent left-sided epistaxis likely due to dry air exposure. Healing with scab, no cauterization needed. Elevated blood pressure may contribute.  - Advise saline sprays, humidifier, and Vaseline for healing.  - Instruct to pinch nose and hold for five minutes during epistaxis.  - Use Afrin nasal spray during epistaxis if necessary, avoid regular use.  - Monitor blood pressure.  - Return if epistaxis persists for potential chemical cauterization.    Cerumen impaction  Cerumen impaction in left ear causing plugging sensation and hearing difficulty. Successfully removed under microscope.  - Advise to return if ear clogging recurs.    Situation reviewed with the patient in detail.    Darvin Alba MD  Otology/Otolaryngology  [facility] [address] [place] [id number]  Phone [phone]  Fax [phone]          [1]   Past Medical History:   Abdominal pain    Acute pharyngitis    viral    Boil    small vaginal boil    Breast mass    nodular mass in breasts s/p implants; 2000-nodular density (simple cyst)    Cervical lesion    Chest tightness    Chest wall pain    Chickenpox    Cough    Diverticulitis    4/02-sigmoid diverticulitis    Diverticulosis    and hyperplastic polyp    Diverticulosis    and hyperplastic polyp     DUB (dysfunctional uterine bleeding)    DUB/anovulatory bleeding    H/O breast augmentation    Heart palpitations    Hematuria    High cholesterol    hx high cholesterol/triglycerides    History of bone density study    Hot flashes    Hyperlipidemia    Irregular periods    Knee pain    Laboratory examination ordered as part of a routine general medical examination    Laceration of finger, right    Measles    Medial meniscus tear    Mole (skin)    moles on back-changing    Mumps    Muscle strain    Osteoarthrosis, localized,  primary, knee    Osteopenia    Pleurisy    Rib pain    RLQ abdominal pain    Sebaceous cyst    vulvar    Sedentary lifestyle    URI (upper respiratory infection)    Wheezing    Work-related stress   [2]   Past Surgical History:  Procedure Laterality Date    Breast surgery procedure unlisted      breast augmentation/saline    Colonoscopy  , , 2009    diverticulosis    Colonoscopy,biopsy  09    Performed by INOCENCIO MELÉNDEZ at [facility]    Colonoscopy,diagnostic  2012    Procedure: COLONOSCOPY, POSSIBLE BIOPSY, POSSIBLE POLYPECTOMY 31904;  Surgeon: Inocencio Meléndez MD;  Location: [facility]    Knee replacement surgery  10/17/12    R TKA          Other surgical history Left     shoulder surgery    Other surgical history      mole removal x2 on back    Other surgical history  12/10/12    left knee manipulation     Skin surgery  2019    Tubal ligation  1989    Laparoscopy with B/L tubal fulguration and cutting   [3]   Current Outpatient Medications on File Prior to Visit   Medication Sig Dispense Refill    alendronate 70 MG Oral Tab Take 1 tablet (70 mg total) by mouth once a week.  06      metoprolol succinate ER 50 MG Oral Tablet 24 Hr Take 1 tablet (50 mg total) by mouth daily.      cholecalciferol 50 MCG (2000 UT) Oral Cap Take 3,000 Units by mouth daily.      Ascorbic Acid (VITAMIN C OR) Take 1 tablet by mouth daily.      Calcium Carbonate-Vitamin D (CALCIUM + D OR) Take 1 tablet by mouth daily.         Coenzyme Q10 (CO Q 10 OR) Take 1 tablet by mouth daily.      Multiple Vitamin (MULTIVITAMINS) Oral Tab 1 TABLET DAILY      VITAMIN E 1 tablet daily.      Cyanocobalamin (VITAMIN B 12 OR) Take 1 tablet by mouth daily.      moxifloxacin 0.5 % Ophthalmic Solution POST-OP: 1 drop in surgery eye QID for one week after surgery. (Patient not taking: Reported on 2025)      prednisoLONE 1 % Ophthalmic Suspension POST-OP: 1 drop in surgery eye  after surgery QID x 1 week, TID x 1 week, BID x 1 week, QD x 1 week (Patient not taking: Reported on 4/17/2025)       No current facility-administered medications on file prior to visit.   [4]   Allergies  Allergen Reactions    Hydrocodone-Acetaminophen OTHER (SEE COMMENTS)     CONSTIPATION.    CONSTIPATION.       CONSTIPATION.   [5]   Family History  Problem Relation Age of Onset    Cancer Mother         colon cancer at 81    Cancer Sister         colon cancer    Cancer Other         colon ca, mat uncle    Other (Allergies/Asthma) Brother     Hypertension Son     Dementia Sister         diagnosed 2022 - 84 years old

## 2025-04-17 NOTE — PROGRESS NOTES
The following individual(s) verbally consented to be recorded using ambient AI listening technology and understand that they can each withdraw their consent to this listening technology at any point by asking the clinician to turn off or pause the recording:  Yes to consent  Patient name: Kaylee Byrd

## 2025-06-30 RX ORDER — ALENDRONATE SODIUM 70 MG/1
70 TABLET ORAL WEEKLY
Qty: 12 TABLET | Refills: 0 | Status: SHIPPED | OUTPATIENT
Start: 2025-06-30

## 2025-06-30 NOTE — TELEPHONE ENCOUNTER
Protocol failed     LOV: 9/9/24   RTC: 1 yr   Filled: 4/8/25 #12   Labs: 8/21/24   Future Appointments   Date Time Provider Department Center   9/10/2025 11:30 AM Andressa Martinez MD [facility] [place]

## (undated) NOTE — MR AVS SNAPSHOT
[address] [address] [phone]               Thank you for choosing us for your health care visit with Elvis Ferrell DO.   We are glad to serve you and happy to provide you with th medical emergencies, dial 911. Visit [email or web address] to learn more. Educational Information     Your blood pressure indicates you may be at-risk for Hypertension. Please consider the following Lifestyle Modifications.   Also, please

## (undated) NOTE — MR AVS SNAPSHOT
[address] [place] [id number] [phone]               Thank you for choosing us for your health care visit with Malathi Garcia DO.   We are glad to serve you and happy to provide you with th multivitamin Tabs   1 TABLET DAILY           VITAMIN B 12 OR   Take 1 tablet by mouth daily. VITAMIN C OR   Take 1 tablet by mouth daily. VITAMIN E   1 tablet daily.                    Follow-up Instructions     Return in about 1 week (

## (undated) NOTE — LETTER
08/12/20        Marilyn Piña  [address] [facility] [address]      Dear Sirisha Giles,    [facility] records indicate that you have outstanding lab work and or testing that was ordered for you and has not yet been completed:      AMINA SCREENING NITA MontesCP